# Patient Record
Sex: FEMALE | Race: OTHER | HISPANIC OR LATINO | Employment: FULL TIME | URBAN - METROPOLITAN AREA
[De-identification: names, ages, dates, MRNs, and addresses within clinical notes are randomized per-mention and may not be internally consistent; named-entity substitution may affect disease eponyms.]

---

## 2017-02-20 ENCOUNTER — HOSPITAL ENCOUNTER (EMERGENCY)
Facility: HOSPITAL | Age: 47
Discharge: HOME | End: 2017-02-20
Attending: EMERGENCY MEDICINE | Admitting: EMERGENCY MEDICINE
Payer: MEDICAID

## 2017-02-20 VITALS
WEIGHT: 110 LBS | SYSTOLIC BLOOD PRESSURE: 157 MMHG | RESPIRATION RATE: 18 BRPM | OXYGEN SATURATION: 98 % | HEART RATE: 85 BPM | TEMPERATURE: 98 F | DIASTOLIC BLOOD PRESSURE: 94 MMHG

## 2017-02-20 DIAGNOSIS — G56.01 CARPAL TUNNEL SYNDROME OF RIGHT WRIST: Primary | ICD-10-CM

## 2017-02-20 PROCEDURE — 99283 EMERGENCY DEPT VISIT LOW MDM: CPT

## 2017-02-20 RX ORDER — IBUPROFEN 600 MG/1
600 TABLET ORAL ONCE
Status: COMPLETED | OUTPATIENT
Start: 2017-02-20 | End: 2017-02-20

## 2017-02-20 RX ORDER — NAPROXEN 500 MG/1
500 TABLET ORAL 2 TIMES DAILY WITH MEALS
Qty: 20 TABLET | Refills: 0 | Status: SHIPPED | OUTPATIENT
Start: 2017-02-20 | End: 2019-04-26 | Stop reason: ALTCHOICE

## 2017-02-20 RX ADMIN — IBUPROFEN 600 MG: 600 TABLET, FILM COATED ORAL at 10:06

## 2018-05-12 ENCOUNTER — CONVERSION ENCOUNTER (OUTPATIENT)
Dept: MAMMOGRAPHY | Facility: CLINIC | Age: 48
End: 2018-05-12

## 2018-05-15 LAB
CCP IGG SERPL-ACNC: <16 UNITS
CRP SERPL-MCNC: 0.6 MG/L
LA PPP-IMP: NORMAL
RHEUMATOID FACT SERPL-ACNC: <14 IU/ML

## 2018-05-23 LAB
LA PPP-IMP: NORMAL
MIXING STUDIES PPP-IMP: NORMAL
SCREEN APTT: 37 SEC
SCREEN DRVVT: 43 SEC

## 2018-06-12 ENCOUNTER — CONVERSION ENCOUNTER (OUTPATIENT)
Dept: MAMMOGRAPHY | Facility: CLINIC | Age: 48
End: 2018-06-12

## 2018-06-25 ENCOUNTER — TRANSCRIBE ORDERS (OUTPATIENT)
Dept: ADMINISTRATIVE | Facility: HOSPITAL | Age: 48
End: 2018-06-25

## 2018-06-25 DIAGNOSIS — Z12.39 SCREENING BREAST EXAMINATION: Primary | ICD-10-CM

## 2019-04-26 ENCOUNTER — HOSPITAL ENCOUNTER (EMERGENCY)
Facility: HOSPITAL | Age: 49
Discharge: HOME | End: 2019-04-26
Attending: EMERGENCY MEDICINE

## 2019-04-26 VITALS
RESPIRATION RATE: 16 BRPM | TEMPERATURE: 98.4 F | DIASTOLIC BLOOD PRESSURE: 95 MMHG | HEART RATE: 78 BPM | SYSTOLIC BLOOD PRESSURE: 163 MMHG | WEIGHT: 130.07 LBS | OXYGEN SATURATION: 98 %

## 2019-04-26 DIAGNOSIS — M25.512 ACUTE PAIN OF LEFT SHOULDER: Primary | ICD-10-CM

## 2019-04-26 PROCEDURE — 96372 THER/PROPH/DIAG INJ SC/IM: CPT

## 2019-04-26 PROCEDURE — 99283 EMERGENCY DEPT VISIT LOW MDM: CPT

## 2019-04-26 PROCEDURE — 99283 EMERGENCY DEPT VISIT LOW MDM: CPT | Performed by: EMERGENCY MEDICINE

## 2019-04-26 RX ORDER — KETOROLAC TROMETHAMINE 30 MG/ML
30 INJECTION, SOLUTION INTRAMUSCULAR; INTRAVENOUS ONCE
Status: COMPLETED | OUTPATIENT
Start: 2019-04-26 | End: 2019-04-26

## 2019-04-26 RX ORDER — NAPROXEN 500 MG/1
500 TABLET ORAL 2 TIMES DAILY WITH MEALS
Qty: 20 TABLET | Refills: 0 | Status: SHIPPED | OUTPATIENT
Start: 2019-04-26 | End: 2020-03-07

## 2019-04-26 RX ORDER — LOPERAMIDE HYDROCHLORIDE 2 MG/1
2 CAPSULE ORAL 4 TIMES DAILY PRN
COMMUNITY
End: 2020-03-07

## 2019-04-26 RX ADMIN — KETOROLAC TROMETHAMINE 30 MG: 30 INJECTION, SOLUTION INTRAMUSCULAR at 07:24

## 2019-04-29 ENCOUNTER — TELEPHONE (OUTPATIENT)
Dept: OBGYN CLINIC | Facility: MEDICAL CENTER | Age: 49
End: 2019-04-29

## 2019-11-25 ENCOUNTER — HOSPITAL ENCOUNTER (EMERGENCY)
Facility: HOSPITAL | Age: 49
Discharge: HOME | End: 2019-11-25
Attending: EMERGENCY MEDICINE | Admitting: EMERGENCY MEDICINE

## 2019-11-25 ENCOUNTER — APPOINTMENT (EMERGENCY)
Dept: CT IMAGING | Facility: HOSPITAL | Age: 49
End: 2019-11-25

## 2019-11-25 VITALS
OXYGEN SATURATION: 100 % | RESPIRATION RATE: 18 BRPM | WEIGHT: 129.63 LBS | TEMPERATURE: 98.3 F | DIASTOLIC BLOOD PRESSURE: 65 MMHG | HEART RATE: 66 BPM | SYSTOLIC BLOOD PRESSURE: 129 MMHG

## 2019-11-25 DIAGNOSIS — R55 NEAR SYNCOPE: Primary | ICD-10-CM

## 2019-11-25 DIAGNOSIS — R20.2 LEFT HAND PARESTHESIA: ICD-10-CM

## 2019-11-25 LAB
ALBUMIN SERPL BCP-MCNC: 3.9 G/DL (ref 3.5–5)
ALP SERPL-CCNC: 87 U/L (ref 46–116)
ALT SERPL W P-5'-P-CCNC: 20 U/L (ref 12–78)
ANION GAP SERPL CALCULATED.3IONS-SCNC: 8 MMOL/L (ref 4–13)
APTT PPP: 28 SECONDS (ref 23–37)
AST SERPL W P-5'-P-CCNC: 21 U/L (ref 5–45)
ATRIAL RATE: 60 BPM
BASOPHILS # BLD AUTO: 0.07 THOUSANDS/ΜL (ref 0–0.1)
BASOPHILS NFR BLD AUTO: 1 % (ref 0–1)
BILIRUB SERPL-MCNC: 0.3 MG/DL (ref 0.2–1)
BUN SERPL-MCNC: 11 MG/DL (ref 5–25)
CALCIUM SERPL-MCNC: 9 MG/DL (ref 8.3–10.1)
CHLORIDE SERPL-SCNC: 103 MMOL/L (ref 100–108)
CO2 SERPL-SCNC: 28 MMOL/L (ref 21–32)
CREAT SERPL-MCNC: 0.47 MG/DL (ref 0.6–1.3)
EOSINOPHIL # BLD AUTO: 0.13 THOUSAND/ΜL (ref 0–0.61)
EOSINOPHIL NFR BLD AUTO: 2 % (ref 0–6)
ERYTHROCYTE [DISTWIDTH] IN BLOOD BY AUTOMATED COUNT: 14.1 % (ref 11.6–15.1)
EXT PREG TEST URINE: NEGATIVE
EXT. CONTROL ED NAV: NORMAL
GFR SERPL CREATININE-BSD FRML MDRD: 118 ML/MIN/1.73SQ M
GLUCOSE SERPL-MCNC: 95 MG/DL (ref 65–140)
HCT VFR BLD AUTO: 44.8 % (ref 34.8–46.1)
HGB BLD-MCNC: 13.6 G/DL (ref 11.5–15.4)
IMM GRANULOCYTES # BLD AUTO: 0.02 THOUSAND/UL (ref 0–0.2)
IMM GRANULOCYTES NFR BLD AUTO: 0 % (ref 0–2)
INR PPP: 1.08 (ref 0.84–1.19)
LYMPHOCYTES # BLD AUTO: 1.41 THOUSANDS/ΜL (ref 0.6–4.47)
LYMPHOCYTES NFR BLD AUTO: 19 % (ref 14–44)
MCH RBC QN AUTO: 28.1 PG (ref 26.8–34.3)
MCHC RBC AUTO-ENTMCNC: 30.4 G/DL (ref 31.4–37.4)
MCV RBC AUTO: 93 FL (ref 82–98)
MONOCYTES # BLD AUTO: 0.4 THOUSAND/ΜL (ref 0.17–1.22)
MONOCYTES NFR BLD AUTO: 5 % (ref 4–12)
NEUTROPHILS # BLD AUTO: 5.59 THOUSANDS/ΜL (ref 1.85–7.62)
NEUTS SEG NFR BLD AUTO: 73 % (ref 43–75)
NRBC BLD AUTO-RTO: 0 /100 WBCS
P AXIS: 61 DEGREES
PLATELET # BLD AUTO: 219 THOUSANDS/UL (ref 149–390)
PMV BLD AUTO: 12.9 FL (ref 8.9–12.7)
POTASSIUM SERPL-SCNC: 3.9 MMOL/L (ref 3.5–5.3)
PR INTERVAL: 150 MS
PROT SERPL-MCNC: 8.1 G/DL (ref 6.4–8.2)
PROTHROMBIN TIME: 14.1 SECONDS (ref 11.6–14.5)
QRS AXIS: 77 DEGREES
QRSD INTERVAL: 82 MS
QT INTERVAL: 422 MS
QTC INTERVAL: 422 MS
RBC # BLD AUTO: 4.84 MILLION/UL (ref 3.81–5.12)
SODIUM SERPL-SCNC: 139 MMOL/L (ref 136–145)
T WAVE AXIS: 71 DEGREES
TROPONIN I SERPL-MCNC: <0.02 NG/ML
TSH SERPL DL<=0.05 MIU/L-ACNC: 2.27 UIU/ML (ref 0.36–3.74)
VENTRICULAR RATE: 60 BPM
WBC # BLD AUTO: 7.62 THOUSAND/UL (ref 4.31–10.16)

## 2019-11-25 PROCEDURE — 96360 HYDRATION IV INFUSION INIT: CPT

## 2019-11-25 PROCEDURE — 85730 THROMBOPLASTIN TIME PARTIAL: CPT | Performed by: EMERGENCY MEDICINE

## 2019-11-25 PROCEDURE — 70498 CT ANGIOGRAPHY NECK: CPT

## 2019-11-25 PROCEDURE — 36415 COLL VENOUS BLD VENIPUNCTURE: CPT | Performed by: EMERGENCY MEDICINE

## 2019-11-25 PROCEDURE — 93005 ELECTROCARDIOGRAM TRACING: CPT

## 2019-11-25 PROCEDURE — 93010 ELECTROCARDIOGRAM REPORT: CPT | Performed by: INTERNAL MEDICINE

## 2019-11-25 PROCEDURE — 99284 EMERGENCY DEPT VISIT MOD MDM: CPT

## 2019-11-25 PROCEDURE — 85025 COMPLETE CBC W/AUTO DIFF WBC: CPT | Performed by: EMERGENCY MEDICINE

## 2019-11-25 PROCEDURE — 84443 ASSAY THYROID STIM HORMONE: CPT | Performed by: EMERGENCY MEDICINE

## 2019-11-25 PROCEDURE — 85610 PROTHROMBIN TIME: CPT | Performed by: EMERGENCY MEDICINE

## 2019-11-25 PROCEDURE — 84484 ASSAY OF TROPONIN QUANT: CPT | Performed by: EMERGENCY MEDICINE

## 2019-11-25 PROCEDURE — 70496 CT ANGIOGRAPHY HEAD: CPT

## 2019-11-25 PROCEDURE — 80053 COMPREHEN METABOLIC PANEL: CPT | Performed by: EMERGENCY MEDICINE

## 2019-11-25 PROCEDURE — 81025 URINE PREGNANCY TEST: CPT | Performed by: EMERGENCY MEDICINE

## 2019-11-25 PROCEDURE — 99285 EMERGENCY DEPT VISIT HI MDM: CPT | Performed by: EMERGENCY MEDICINE

## 2019-11-25 RX ADMIN — IOHEXOL 85 ML: 350 INJECTION, SOLUTION INTRAVENOUS at 16:05

## 2019-11-25 RX ADMIN — SODIUM CHLORIDE 1000 ML: 0.9 INJECTION, SOLUTION INTRAVENOUS at 15:09

## 2019-11-25 NOTE — ED PROVIDER NOTES
History  Chief Complaint   Patient presents with    Syncope     Pt  has a syncopal episode about 30 minutes ago  Pt  reports fainting and being caught by her   Pt  reports since then her left hand has been numb  Pt  Had a presyncopal event when she felt dizzy while walking today  And her  caught her   She didn't lose consiousness  She started with L hand numbness since about 12:30pm or so  Prior to Admission Medications   Prescriptions Last Dose Informant Patient Reported? Taking?   loperamide (IMODIUM) 2 mg capsule   Yes No   Sig: Take 2 mg by mouth 4 (four) times a day as needed for diarrhea   naproxen (NAPROSYN) 500 mg tablet   No No   Sig: Take 1 tablet (500 mg total) by mouth 2 (two) times a day with meals for 10 days      Facility-Administered Medications: None       Past Medical History:   Diagnosis Date    Gluten free diet        History reviewed  No pertinent surgical history  History reviewed  No pertinent family history  I have reviewed and agree with the history as documented  Social History     Tobacco Use    Smoking status: Never Smoker    Smokeless tobacco: Never Used   Substance Use Topics    Alcohol use: No    Drug use: No        Review of Systems   Constitutional: Negative for appetite change, fatigue and fever  HENT: Negative for rhinorrhea and sore throat  Respiratory: Negative for cough, shortness of breath and wheezing  Cardiovascular: Negative for chest pain and leg swelling  Gastrointestinal: Negative for abdominal pain, diarrhea and vomiting  Genitourinary: Negative for dysuria and flank pain  Musculoskeletal: Negative for back pain and neck pain  Skin: Negative for rash  Neurological: Positive for syncope and numbness  Negative for headaches  Psychiatric/Behavioral:        Mood normal       Physical Exam  Physical Exam   Constitutional: She is oriented to person, place, and time  She appears well-developed and well-nourished  HENT:   Head: Normocephalic and atraumatic  Mouth/Throat: Oropharynx is clear and moist    Eyes: Pupils are equal, round, and reactive to light  Neck: Normal range of motion  Neck supple  Cardiovascular: Normal rate and regular rhythm  Pulmonary/Chest: Effort normal and breath sounds normal    Abdominal: Soft  There is no tenderness  Musculoskeletal: Normal range of motion  Neurological: She is alert and oriented to person, place, and time  No cranial nerve deficit or sensory deficit  No motor or sensory losses   Skin: Skin is warm and dry  Nursing note and vitals reviewed  Vital Signs  ED Triage Vitals [11/25/19 1423]   Temperature Pulse Respirations Blood Pressure SpO2   98 3 °F (36 8 °C) 70 20 153/77 100 %      Temp Source Heart Rate Source Patient Position - Orthostatic VS BP Location FiO2 (%)   Oral Monitor Sitting Right arm --      Pain Score       No Pain           Vitals:    11/25/19 1423 11/25/19 1515 11/25/19 1705   BP: 153/77 141/79 129/65   Pulse: 70 60 66   Patient Position - Orthostatic VS: Sitting Lying Lying         Visual Acuity      ED Medications  Medications   sodium chloride 0 9 % bolus 1,000 mL (0 mL Intravenous Stopped 11/25/19 1611)   iohexol (OMNIPAQUE) 350 MG/ML injection (MULTI-DOSE) 85 mL (85 mL Intravenous Given 11/25/19 1605)       Diagnostic Studies  Results Reviewed     Procedure Component Value Units Date/Time    TSH [[id number]]  (Normal) Collected:  11/25/19 1509    Lab Status:  Final result Specimen:  Blood from Arm, Right Updated:  11/25/19 1551     TSH 3RD GENERATON 2 272 uIU/mL     Narrative:       Patients undergoing fluorescein dye angiography may retain small amounts of fluorescein in the body for 48-72 hours post procedure  Samples containing fluorescein can produce falsely depressed TSH values  If the patient had this procedure,a specimen should be resubmitted post fluorescein clearance        Troponin I [[id number]]  (Normal) Collected:  11/25/19 1731 Lab Status:  Final result Specimen:  Blood from Arm, Right Updated:  11/25/19 1545     Troponin I <0 02 ng/mL     Comprehensive metabolic panel [[id number]]  (Abnormal) Collected:  11/25/19 1509    Lab Status:  Final result Specimen:  Blood from Arm, Right Updated:  11/25/19 1543     Sodium 139 mmol/L      Potassium 3 9 mmol/L      Chloride 103 mmol/L      CO2 28 mmol/L      ANION GAP 8 mmol/L      BUN 11 mg/dL      Creatinine 0 47 mg/dL      Glucose 95 mg/dL      Calcium 9 0 mg/dL      AST 21 U/L      ALT 20 U/L      Alkaline Phosphatase 87 U/L      Total Protein 8 1 g/dL      Albumin 3 9 g/dL      Total Bilirubin 0 30 mg/dL      eGFR 118 ml/min/1 73sq m     Narrative:       [facility] guidelines for Chronic Kidney Disease (CKD):     Stage 1 with normal or high GFR (GFR > 90 mL/min/1 73 square meters)    Stage 2 Mild CKD (GFR = 60-89 mL/min/1 73 square meters)    Stage 3A Moderate CKD (GFR = 45-59 mL/min/1 73 square meters)    Stage 3B Moderate CKD (GFR = 30-44 mL/min/1 73 square meters)    Stage 4 Severe CKD (GFR = 15-29 mL/min/1 73 square meters)    Stage 5 End Stage CKD (GFR <15 mL/min/1 73 square meters)  Note: GFR calculation is accurate only with a steady state creatinine    Protime-INR [[id number]]  (Normal) Collected:  11/25/19 1510    Lab Status:  Final result Specimen:  Blood from Arm, Right Updated:  11/25/19 1531     Protime 14 1 seconds      INR 1 08    APTT [[id number]]  (Normal) Collected:  11/25/19 1510    Lab Status:  Final result Specimen:  Blood from Arm, Right Updated:  11/25/19 1531     PTT 28 seconds     POCT pregnancy, urine [[id number]]  (Normal) Resulted:  11/25/19 1523    Lab Status:  Final result Updated:  11/25/19 1523     EXT PREG TEST UR (Ref: Negative) negative     Control valid    CBC and differential [[id number]]  (Abnormal) Collected:  11/25/19 1509    Lab Status:  Final result Specimen:  Blood from Arm, Right Updated:  11/25/19 1514     WBC 7 62 Thousand/uL RBC 4 84 Million/uL      Hemoglobin 13 6 g/dL      Hematocrit 44 8 %      MCV 93 fL      MCH 28 1 pg      MCHC 30 4 g/dL      RDW 14 1 %      MPV 12 9 fL      Platelets 077 Thousands/uL      nRBC 0 /100 WBCs      Neutrophils Relative 73 %      Immat GRANS % 0 %      Lymphocytes Relative 19 %      Monocytes Relative 5 %      Eosinophils Relative 2 %      Basophils Relative 1 %      Neutrophils Absolute 5 59 Thousands/µL      Immature Grans Absolute 0 02 Thousand/uL      Lymphocytes Absolute 1 41 Thousands/µL      Monocytes Absolute 0 40 Thousand/µL      Eosinophils Absolute 0 13 Thousand/µL      Basophils Absolute 0 07 Thousands/µL                  CTA head and neck with and without contrast   Final Result by Haseeb Rooney MD (11/25 1635)      No acute intracranial disease  No large vessel flow restrictive disease within the head or neck                    Workstation performed: [id number]                    Procedures  ECG 12 Lead Documentation Only  Date/Time: 11/25/2019 3:11 PM  Performed by: Chong Cantu MD  Authorized by: Chong Cantu MD     Rate:     ECG rate:  60    ECG rate assessment: normal    Rhythm:     Rhythm: sinus rhythm    Comments:      No st elevation or depression           ED Course             Stroke Assessment     Row Name 11/25/19 1504             NIH Stroke Scale    Interval  Baseline      Level of Consciousness (1a )  0      LOC Questions (1b )  0      LOC Commands (1c )  0      Best Gaze (2 )  0      Visual (3 )  0      Facial Palsy (4 )  0      Motor Arm, Left (5a )  0      Motor Arm, Right (5b )  0      Motor Leg, Left (6a )  0      Motor Leg, Right (6b )  0      Limb Ataxia (7 )  0      Sensory (8 )  0      Best Language (9 )  0      Dysarthria (10 )  0      Extinction and Inattention (11 ) (Formerly Neglect)  0      Total  0                          MDM  Number of Diagnoses or Management Options  Left hand paresthesia:   Near syncope:      Amount and/or Complexity of Data Reviewed  Clinical lab tests: ordered and reviewed  Tests in the radiology section of CPT®: ordered and reviewed    Risk of Complications, Morbidity, and/or Mortality  Presenting problems: moderate  General comments: I spoke to Dr Mehrdad Yuen, neurologist on call  I went over the case with him  Pt  Has a normal neuro exam   NIH score of 0  She wouldn't need tpa so she does not need to be a stroke alert and since the L hand numbness is subjective she can follow up as an outpt  Disposition  Final diagnoses:   Near syncope   Left hand paresthesia     Time reflects when diagnosis was documented in both MDM as applicable and the Disposition within this note     Time User Action Codes Description Comment    11/25/2019  4:59 PM Madeline Denton Add [R55] Near syncope     11/25/2019  4:59 PM Casi Clarke Add [R20 2] Left hand paresthesia       ED Disposition     ED Disposition Condition Date/Time Comment    Discharge Stable Mon Nov 25, 2019  4:59 PM Serafin Mahajan discharge to home/self care              Follow-up Information     Follow up With Specialties Details Why Contact Info Additional [id number] [id number] [facility] [id number]  [address] [place] [address],[address], [address]    [facility]   [address] [facility] [address]  [address] [place] [address] Neurology [facility], [address], [address]          Discharge Medication List as of 11/25/2019  5:00 PM      CONTINUE these medications which have NOT CHANGED    Details   loperamide (IMODIUM) 2 mg capsule Take 2 mg by mouth 4 (four) times a day as needed for diarrhea, Historical Med      naproxen (NAPROSYN) 500 mg tablet Take 1 tablet (500 mg total) by mouth 2 (two) times a day with meals for 10 days, Starting Fri 4/26/2019, Until Mon 5/6/2019, Print           No discharge procedures on file      ED Provider  Electronically Signed by           Gabriela Wu MD  11/25/19 4291

## 2020-03-07 ENCOUNTER — APPOINTMENT (EMERGENCY)
Dept: CT IMAGING | Facility: HOSPITAL | Age: 50
End: 2020-03-07

## 2020-03-07 ENCOUNTER — HOSPITAL ENCOUNTER (EMERGENCY)
Facility: HOSPITAL | Age: 50
Discharge: HOME | End: 2020-03-07
Attending: EMERGENCY MEDICINE | Admitting: EMERGENCY MEDICINE

## 2020-03-07 VITALS
WEIGHT: 122.36 LBS | SYSTOLIC BLOOD PRESSURE: 133 MMHG | HEART RATE: 64 BPM | OXYGEN SATURATION: 100 % | DIASTOLIC BLOOD PRESSURE: 73 MMHG | TEMPERATURE: 97.4 F | RESPIRATION RATE: 16 BRPM

## 2020-03-07 DIAGNOSIS — K52.9 COLITIS: Primary | ICD-10-CM

## 2020-03-07 LAB
ALBUMIN SERPL BCP-MCNC: 3.5 G/DL (ref 3.5–5)
ALP SERPL-CCNC: 66 U/L (ref 46–116)
ALT SERPL W P-5'-P-CCNC: 22 U/L (ref 12–78)
ANION GAP SERPL CALCULATED.3IONS-SCNC: 9 MMOL/L (ref 4–13)
AST SERPL W P-5'-P-CCNC: 17 U/L (ref 5–45)
BASOPHILS # BLD AUTO: 0.04 THOUSANDS/ΜL (ref 0–0.1)
BASOPHILS NFR BLD AUTO: 1 % (ref 0–1)
BILIRUB SERPL-MCNC: 0.23 MG/DL (ref 0.2–1)
BILIRUB UR QL STRIP: NEGATIVE
BUN SERPL-MCNC: 12 MG/DL (ref 5–25)
CALCIUM SERPL-MCNC: 8.3 MG/DL (ref 8.3–10.1)
CHLORIDE SERPL-SCNC: 104 MMOL/L (ref 100–108)
CLARITY UR: CLEAR
CLARITY, POC: CLEAR
CO2 SERPL-SCNC: 24 MMOL/L (ref 21–32)
COLOR UR: YELLOW
COLOR, POC: YELLOW
CREAT SERPL-MCNC: 0.51 MG/DL (ref 0.6–1.3)
EOSINOPHIL # BLD AUTO: 0.08 THOUSAND/ΜL (ref 0–0.61)
EOSINOPHIL NFR BLD AUTO: 1 % (ref 0–6)
ERYTHROCYTE [DISTWIDTH] IN BLOOD BY AUTOMATED COUNT: 14.6 % (ref 11.6–15.1)
EXT PREG TEST URINE: NEGATIVE
EXT. CONTROL ED NAV: NORMAL
GFR SERPL CREATININE-BSD FRML MDRD: 115 ML/MIN/1.73SQ M
GLUCOSE SERPL-MCNC: 102 MG/DL (ref 65–140)
GLUCOSE UR STRIP-MCNC: NEGATIVE MG/DL
HCT VFR BLD AUTO: 38.7 % (ref 34.8–46.1)
HGB BLD-MCNC: 11.6 G/DL (ref 11.5–15.4)
HGB UR QL STRIP.AUTO: NEGATIVE
IMM GRANULOCYTES # BLD AUTO: 0.01 THOUSAND/UL (ref 0–0.2)
IMM GRANULOCYTES NFR BLD AUTO: 0 % (ref 0–2)
KETONES UR STRIP-MCNC: ABNORMAL MG/DL
LEUKOCYTE ESTERASE UR QL STRIP: NEGATIVE
LIPASE SERPL-CCNC: 186 U/L (ref 73–393)
LYMPHOCYTES # BLD AUTO: 0.9 THOUSANDS/ΜL (ref 0.6–4.47)
LYMPHOCYTES NFR BLD AUTO: 12 % (ref 14–44)
MCH RBC QN AUTO: 26.6 PG (ref 26.8–34.3)
MCHC RBC AUTO-ENTMCNC: 30 G/DL (ref 31.4–37.4)
MCV RBC AUTO: 89 FL (ref 82–98)
MONOCYTES # BLD AUTO: 0.32 THOUSAND/ΜL (ref 0.17–1.22)
MONOCYTES NFR BLD AUTO: 4 % (ref 4–12)
NEUTROPHILS # BLD AUTO: 6.38 THOUSANDS/ΜL (ref 1.85–7.62)
NEUTS SEG NFR BLD AUTO: 82 % (ref 43–75)
NITRITE UR QL STRIP: NEGATIVE
NRBC BLD AUTO-RTO: 0 /100 WBCS
PH UR STRIP.AUTO: 5.5 [PH] (ref 4.5–8)
PLATELET # BLD AUTO: 222 THOUSANDS/UL (ref 149–390)
PMV BLD AUTO: 13.2 FL (ref 8.9–12.7)
POTASSIUM SERPL-SCNC: 3.9 MMOL/L (ref 3.5–5.3)
PROT SERPL-MCNC: 7.5 G/DL (ref 6.4–8.2)
PROT UR STRIP-MCNC: NEGATIVE MG/DL
RBC # BLD AUTO: 4.36 MILLION/UL (ref 3.81–5.12)
SODIUM SERPL-SCNC: 137 MMOL/L (ref 136–145)
SP GR UR STRIP.AUTO: >=1.03 (ref 1–1.03)
UROBILINOGEN UR QL STRIP.AUTO: 0.2 E.U./DL
WBC # BLD AUTO: 7.73 THOUSAND/UL (ref 4.31–10.16)

## 2020-03-07 PROCEDURE — 83690 ASSAY OF LIPASE: CPT | Performed by: EMERGENCY MEDICINE

## 2020-03-07 PROCEDURE — 96375 TX/PRO/DX INJ NEW DRUG ADDON: CPT

## 2020-03-07 PROCEDURE — 96361 HYDRATE IV INFUSION ADD-ON: CPT

## 2020-03-07 PROCEDURE — 99284 EMERGENCY DEPT VISIT MOD MDM: CPT

## 2020-03-07 PROCEDURE — 74177 CT ABD & PELVIS W/CONTRAST: CPT

## 2020-03-07 PROCEDURE — 99285 EMERGENCY DEPT VISIT HI MDM: CPT | Performed by: EMERGENCY MEDICINE

## 2020-03-07 PROCEDURE — 36415 COLL VENOUS BLD VENIPUNCTURE: CPT | Performed by: EMERGENCY MEDICINE

## 2020-03-07 PROCEDURE — 96374 THER/PROPH/DIAG INJ IV PUSH: CPT

## 2020-03-07 PROCEDURE — 81025 URINE PREGNANCY TEST: CPT | Performed by: EMERGENCY MEDICINE

## 2020-03-07 PROCEDURE — 85025 COMPLETE CBC W/AUTO DIFF WBC: CPT | Performed by: EMERGENCY MEDICINE

## 2020-03-07 PROCEDURE — 80053 COMPREHEN METABOLIC PANEL: CPT | Performed by: EMERGENCY MEDICINE

## 2020-03-07 PROCEDURE — 81003 URINALYSIS AUTO W/O SCOPE: CPT

## 2020-03-07 RX ORDER — MORPHINE SULFATE 4 MG/ML
4 INJECTION, SOLUTION INTRAMUSCULAR; INTRAVENOUS ONCE
Status: COMPLETED | OUTPATIENT
Start: 2020-03-07 | End: 2020-03-07

## 2020-03-07 RX ORDER — DICYCLOMINE HYDROCHLORIDE 10 MG/1
10 CAPSULE ORAL ONCE
Status: COMPLETED | OUTPATIENT
Start: 2020-03-07 | End: 2020-03-07

## 2020-03-07 RX ORDER — DICYCLOMINE HCL 20 MG
20 TABLET ORAL 4 TIMES DAILY PRN
Qty: 12 TABLET | Refills: 0 | Status: SHIPPED | OUTPATIENT
Start: 2020-03-07 | End: 2021-06-07 | Stop reason: ALTCHOICE

## 2020-03-07 RX ORDER — ONDANSETRON 4 MG/1
4 TABLET, ORALLY DISINTEGRATING ORAL EVERY 6 HOURS PRN
Qty: 8 TABLET | Refills: 0 | Status: SHIPPED | OUTPATIENT
Start: 2020-03-07 | End: 2021-06-07 | Stop reason: ALTCHOICE

## 2020-03-07 RX ADMIN — SODIUM CHLORIDE 1000 ML: 0.9 INJECTION, SOLUTION INTRAVENOUS at 10:30

## 2020-03-07 RX ADMIN — FAMOTIDINE 20 MG: 10 INJECTION INTRAVENOUS at 10:31

## 2020-03-07 RX ADMIN — DICYCLOMINE HYDROCHLORIDE 10 MG: 10 CAPSULE ORAL at 10:18

## 2020-03-07 RX ADMIN — IOHEXOL 100 ML: 350 INJECTION, SOLUTION INTRAVENOUS at 11:45

## 2020-03-07 RX ADMIN — MORPHINE SULFATE 4 MG: 4 INJECTION INTRAVENOUS at 12:42

## 2020-03-07 NOTE — DISCHARGE INSTRUCTIONS
Colitis  LO QUE NECESITAS SABER:  La colitis es hinchazón e irritación de bills colon  La colitis puede ser causada por úlceras o un problema con bills sistema inmunológico  Las bacterias, un virus o un parásito también pueden causar colitis  La causa puede no ser conocida  Es posible que tenga diarrea, dolor abdominal, fiebre o gayle o moco en el movimiento intestinal   INSTRUCCIONES DE DESCARGA:  Regrese al departamento de emergencias si:   Tienes problemas repentinos para respirar  Tamika deposiciones son negras o continúan teniendo gayle en ellas  Tienes gayle en tu vómito  Tiene dolor abdominal intenso o bills abdomen está hinchado y se siente bebeto  Tiene alguno de los siguientes signos de deshidratación:     Mareos o debilidad     Sequedad en la boca, labios agrietados o sed severa   Latidos cardíacos rápidos o respiración     Orinar muy poco o nada  Póngase en contacto con bills proveedor de atención médica si:   Tamika síntomas empeoran o no desaparecen  Tiene fiebre, escalofríos, tos o se siente débil y adolorido  De repente pierdes peso sin intentarlo  Tiene preguntas o inquietudes sobre bills afección o atención

## 2020-03-07 NOTE — ED PROVIDER NOTES
History  Chief Complaint   Patient presents with    Abdominal Pain     since last night, pain and diarrhea  History provided by:  Patient  Abdominal Pain   Pain location:  Generalized  Pain radiates to:  Does not radiate  Pain severity:  Moderate  Onset quality:  Gradual  Duration:  12 hours  Timing:  Constant  Progression:  Worsening  Chronicity:  New  Context: awakening from sleep    Relieved by:  None tried  Worsened by:  Nothing  Associated symptoms: chills, diarrhea, fever and nausea    Associated symptoms: no chest pain, no cough, no dysuria, no hematuria, no shortness of breath, no sore throat and no vomiting    Diarrhea:     Quality:  Watery    Number of occurrences:  More than 8    Severity:  Moderate  Risk factors: has not had multiple surgeries        None       Past Medical History:   Diagnosis Date    Gluten free diet        Past Surgical History:   Procedure Laterality Date     SECTION         No family history on file  I have reviewed and agree with the history as documented  E-Cigarette/Vaping     E-Cigarette/Vaping Substances     Social History     Tobacco Use    Smoking status: Never Smoker    Smokeless tobacco: Never Used   Substance Use Topics    Alcohol use: No    Drug use: No       Review of Systems   Constitutional: Positive for chills and fever  Negative for appetite change  HENT: Negative for sore throat  Respiratory: Negative for cough, shortness of breath and wheezing  Cardiovascular: Negative for chest pain and palpitations  Gastrointestinal: Positive for abdominal pain, diarrhea and nausea  Negative for vomiting  Genitourinary: Negative for dysuria and hematuria  Musculoskeletal: Negative for neck pain  Skin: Negative for rash  Neurological: Negative for dizziness, weakness and headaches  Psychiatric/Behavioral: Negative for suicidal ideas  All other systems reviewed and are negative        Physical Exam  Physical Exam   Constitutional: She is oriented to person, place, and time  Vital signs are normal  She appears well-developed and well-nourished  Non-toxic appearance  HENT:   Head: Normocephalic and atraumatic  Right Ear: Tympanic membrane and external ear normal    Left Ear: Tympanic membrane and external ear normal    Nose: Nose normal    Mouth/Throat: Oropharynx is clear and moist    Eyes: Pupils are equal, round, and reactive to light  Conjunctivae and EOM are normal    Neck: Normal range of motion and full passive range of motion without pain  Neck supple  No Brudzinski's sign and no Kernig's sign noted  Cardiovascular: Normal rate, regular rhythm, normal heart sounds, intact distal pulses and normal pulses  No murmur heard  Pulmonary/Chest: Effort normal and breath sounds normal  No tachypnea  No respiratory distress  She has no wheezes  Abdominal: Soft  She exhibits no distension  Bowel sounds are increased  There is generalized tenderness and tenderness in the right lower quadrant and suprapubic area  There is no rigidity, no rebound and no guarding  Musculoskeletal: Normal range of motion  Right lower leg: She exhibits no swelling  Left lower leg: She exhibits no swelling  Lymphadenopathy:     She has no cervical adenopathy  Neurological: She is alert and oriented to person, place, and time  She has normal strength and normal reflexes  No cranial nerve deficit or sensory deficit  Coordination and gait normal  GCS eye subscore is 4  GCS verbal subscore is 5  GCS motor subscore is 6  Skin: Skin is warm and dry  No rash noted  She is not diaphoretic  No pallor  Psychiatric: She has a normal mood and affect  Her speech is normal and behavior is normal  Judgment and thought content normal  Cognition and memory are normal    Nursing note and vitals reviewed        Vital Signs  ED Triage Vitals [03/07/20 0942]   Temperature Pulse Respirations Blood Pressure SpO2   (!) 97 4 °F (36 3 °C) 60 18 107/63 100 % Temp Source Heart Rate Source Patient Position - Orthostatic VS BP Location FiO2 (%)   Oral Monitor Sitting Right arm --      Pain Score       8           Vitals:    03/07/20 0942 03/07/20 1057 03/07/20 1100 03/07/20 1242   BP: 107/63 131/67 131/67 133/73   Pulse: 60 60 60 64   Patient Position - Orthostatic VS: Sitting Lying           Visual Acuity      ED Medications  Medications   sodium chloride 0 9 % bolus 1,000 mL (0 mL Intravenous Stopped 3/7/20 1242)   famotidine (PEPCID) injection 20 mg (20 mg Intravenous Given 3/7/20 1031)   dicyclomine (BENTYL) capsule 10 mg (10 mg Oral Given 3/7/20 1018)   iohexol (OMNIPAQUE) 350 MG/ML injection (SINGLE-DOSE) 100 mL (100 mL Intravenous Given 3/7/20 1145)   morphine (PF) 4 mg/mL injection 4 mg (4 mg Intravenous Given 3/7/20 1242)       Diagnostic Studies  Results Reviewed     Procedure Component Value Units Date/Time    Comprehensive metabolic panel [[id number]]  (Abnormal) Collected:  03/07/20 1029    Lab Status:  Final result Specimen:  Blood from Arm, Left Updated:  03/07/20 1058     Sodium 137 mmol/L      Potassium 3 9 mmol/L      Chloride 104 mmol/L      CO2 24 mmol/L      ANION GAP 9 mmol/L      BUN 12 mg/dL      Creatinine 0 51 mg/dL      Glucose 102 mg/dL      Calcium 8 3 mg/dL      AST 17 U/L      ALT 22 U/L      Alkaline Phosphatase 66 U/L      Total Protein 7 5 g/dL      Albumin 3 5 g/dL      Total Bilirubin 0 23 mg/dL      eGFR 115 ml/min/1 73sq m     Narrative:       [facility] guidelines for Chronic Kidney Disease (CKD):     Stage 1 with normal or high GFR (GFR > 90 mL/min/1 73 square meters)    Stage 2 Mild CKD (GFR = 60-89 mL/min/1 73 square meters)    Stage 3A Moderate CKD (GFR = 45-59 mL/min/1 73 square meters)    Stage 3B Moderate CKD (GFR = 30-44 mL/min/1 73 square meters)    Stage 4 Severe CKD (GFR = 15-29 mL/min/1 73 square meters)    Stage 5 End Stage CKD (GFR <15 mL/min/1 73 square meters)  Note: GFR calculation is accurate only with a steady state creatinine    Lipase [[id number]]  (Normal) Collected:  03/07/20 1029    Lab Status:  Final result Specimen:  Blood from Arm, Left Updated:  03/07/20 1058     Lipase 186 u/L     CBC and differential [[id number]]  (Abnormal) Collected:  03/07/20 1029    Lab Status:  Final result Specimen:  Blood from Arm, Left Updated:  03/07/20 1038     WBC 7 73 Thousand/uL      RBC 4 36 Million/uL      Hemoglobin 11 6 g/dL      Hematocrit 38 7 %      MCV 89 fL      MCH 26 6 pg      MCHC 30 0 g/dL      RDW 14 6 %      MPV 13 2 fL      Platelets 361 Thousands/uL      nRBC 0 /100 WBCs      Neutrophils Relative 82 %      Immat GRANS % 0 %      Lymphocytes Relative 12 %      Monocytes Relative 4 %      Eosinophils Relative 1 %      Basophils Relative 1 %      Neutrophils Absolute 6 38 Thousands/µL      Immature Grans Absolute 0 01 Thousand/uL      Lymphocytes Absolute 0 90 Thousands/µL      Monocytes Absolute 0 32 Thousand/µL      Eosinophils Absolute 0 08 Thousand/µL      Basophils Absolute 0 04 Thousands/µL     POCT pregnancy, urine [[id number]]  (Normal) Resulted:  03/07/20 1000    Lab Status:  Final result Updated:  03/07/20 1000     EXT PREG TEST UR (Ref: Negative) negative     Control valid    POCT urinalysis dipstick [[id number]]  (Normal) Resulted:  03/07/20 1000    Lab Status:  Final result Updated:  03/07/20 1000     Color, UA yellow     Clarity, UA clear    Urine Macroscopic, POC [[id number]]  (Abnormal) Collected:  03/07/20 0957    Lab Status:  Final result Specimen:  Urine Updated:  03/07/20 0959     Color, UA Yellow     Clarity, UA Clear     pH, UA 5 5     Leukocytes, UA Negative     Nitrite, UA Negative     Protein, UA Negative mg/dl      Glucose, UA Negative mg/dl      Ketones, UA Trace mg/dl      Urobilinogen, UA 0 2 E U /dl      Bilirubin, UA Negative     Blood, UA Negative     Specific Gravity, UA >=1 030    Narrative:       CLINITEK RESULT                 CT abdomen pelvis with contrast Final Result by Ortiz Lowe MD (03/07 1214)   1  Diffuse wall thickening of the right, transverse, left and rectosigmoid colon, most concerning for colitis  Workstation performed: [id number]                    Procedures  Procedures         ED Course  ED Course as of Mar 07 1439   Sat Mar 07, 2020   1226 CT findings c/w colitis, no abscess, no diverticultis, no appendicitis   CT abdomen pelvis with contrast                               MDM      Disposition  Final diagnoses:   Colitis     Time reflects when diagnosis was documented in both MDM as applicable and the Disposition within this note     Time User Action Codes Description Comment    3/7/2020 12:28 PM Marya Altman Add [K52 9] Colitis       ED Disposition     ED Disposition Condition Date/Time Comment    Discharge Good Sat Mar 7, 2020 12:28 PM Eugenio Barragan discharge to home/self care  Follow-up Information     Follow up With Specialties Details Why Contact Info    [employer]  Call  For followup with primary care as needed [phone]            Discharge Medication List as of 3/7/2020 12:33 PM      START taking these medications    Details   dicyclomine (BENTYL) 20 mg tablet Take 1 tablet (20 mg total) by mouth 4 (four) times a day as needed (abdominal cramping, diarrhea), Starting Sat 3/7/2020, Print      ondansetron (ZOFRAN-ODT) 4 mg disintegrating tablet Take 1 tablet (4 mg total) by mouth every 6 (six) hours as needed for nausea or vomiting, Starting Sat 3/7/2020, Print           No discharge procedures on file      PDMP Review     None          ED Provider  Electronically Signed by           Sherron Parish MD  03/07/20 4680

## 2020-12-08 ENCOUNTER — HOSPITAL ENCOUNTER (EMERGENCY)
Facility: HOSPITAL | Age: 50
Discharge: HOME | End: 2020-12-08
Attending: EMERGENCY MEDICINE

## 2020-12-08 VITALS
HEART RATE: 77 BPM | SYSTOLIC BLOOD PRESSURE: 124 MMHG | DIASTOLIC BLOOD PRESSURE: 78 MMHG | WEIGHT: 123.9 LBS | TEMPERATURE: 97.5 F | OXYGEN SATURATION: 100 % | RESPIRATION RATE: 20 BRPM

## 2020-12-08 DIAGNOSIS — J06.9 URI (UPPER RESPIRATORY INFECTION): Primary | ICD-10-CM

## 2020-12-08 DIAGNOSIS — Z20.822 ENCOUNTER FOR LABORATORY TESTING FOR COVID-19 VIRUS: ICD-10-CM

## 2020-12-08 PROCEDURE — U0003 INFECTIOUS AGENT DETECTION BY NUCLEIC ACID (DNA OR RNA); SEVERE ACUTE RESPIRATORY SYNDROME CORONAVIRUS 2 (SARS-COV-2) (CORONAVIRUS DISEASE [COVID-19]), AMPLIFIED PROBE TECHNIQUE, MAKING USE OF HIGH THROUGHPUT TECHNOLOGIES AS DESCRIBED BY CMS-2020-01-R: HCPCS | Performed by: PHYSICIAN ASSISTANT

## 2020-12-08 PROCEDURE — 99284 EMERGENCY DEPT VISIT MOD MDM: CPT

## 2020-12-08 PROCEDURE — 99284 EMERGENCY DEPT VISIT MOD MDM: CPT | Performed by: EMERGENCY MEDICINE

## 2020-12-08 RX ORDER — BENZONATATE 100 MG/1
100 CAPSULE ORAL 3 TIMES DAILY PRN
Qty: 20 CAPSULE | Refills: 0 | Status: SHIPPED | OUTPATIENT
Start: 2020-12-08 | End: 2020-12-15

## 2020-12-10 LAB — SARS-COV-2 RNA SPEC QL NAA+PROBE: DETECTED

## 2021-04-12 ENCOUNTER — OFFICE VISIT (OUTPATIENT)
Dept: FAMILY MEDICINE CLINIC | Facility: CLINIC | Age: 51
End: 2021-04-12
Payer: COMMERCIAL

## 2021-04-12 VITALS
TEMPERATURE: 98 F | RESPIRATION RATE: 18 BRPM | OXYGEN SATURATION: 100 % | HEART RATE: 84 BPM | SYSTOLIC BLOOD PRESSURE: 110 MMHG | BODY MASS INDEX: 23.79 KG/M2 | HEIGHT: 61 IN | WEIGHT: 126 LBS | DIASTOLIC BLOOD PRESSURE: 72 MMHG

## 2021-04-12 DIAGNOSIS — Z86.16 PERSONAL HISTORY OF COVID-19: ICD-10-CM

## 2021-04-12 DIAGNOSIS — B02.9 HERPES ZOSTER WITHOUT COMPLICATION: ICD-10-CM

## 2021-04-12 DIAGNOSIS — R53.83 OTHER FATIGUE: Primary | ICD-10-CM

## 2021-04-12 DIAGNOSIS — E78.2 MIXED HYPERLIPIDEMIA: ICD-10-CM

## 2021-04-12 DIAGNOSIS — Z12.4 SCREENING FOR CERVICAL CANCER: ICD-10-CM

## 2021-04-12 DIAGNOSIS — M32.9 SYSTEMIC LUPUS ERYTHEMATOSUS, UNSPECIFIED SLE TYPE, UNSPECIFIED ORGAN INVOLVEMENT STATUS (HCC): ICD-10-CM

## 2021-04-12 DIAGNOSIS — Z11.4 SCREENING FOR HIV (HUMAN IMMUNODEFICIENCY VIRUS): ICD-10-CM

## 2021-04-12 DIAGNOSIS — Z12.31 ENCOUNTER FOR SCREENING MAMMOGRAM FOR MALIGNANT NEOPLASM OF BREAST: ICD-10-CM

## 2021-04-12 DIAGNOSIS — R73.9 HYPERGLYCEMIA: ICD-10-CM

## 2021-04-12 PROCEDURE — 3008F BODY MASS INDEX DOCD: CPT | Performed by: NURSE PRACTITIONER

## 2021-04-12 PROCEDURE — 1036F TOBACCO NON-USER: CPT | Performed by: NURSE PRACTITIONER

## 2021-04-12 PROCEDURE — 3725F SCREEN DEPRESSION PERFORMED: CPT | Performed by: NURSE PRACTITIONER

## 2021-04-12 PROCEDURE — 99204 OFFICE O/P NEW MOD 45 MIN: CPT | Performed by: NURSE PRACTITIONER

## 2021-04-12 RX ORDER — VALACYCLOVIR HYDROCHLORIDE 1 G/1
1000 TABLET, FILM COATED ORAL EVERY 8 HOURS
Qty: 21 TABLET | Refills: 0 | Status: SHIPPED | OUTPATIENT
Start: 2021-04-12 | End: 2021-06-07 | Stop reason: ALTCHOICE

## 2021-04-12 NOTE — PROGRESS NOTES
BMI Counseling: Body mass index is 23 81 kg/m²  The BMI is above normal  Nutrition recommendations include encouraging healthy choices of fruits and vegetables, decreasing fast food intake, consuming healthier snacks, limiting drinks that contain sugar, increasing intake of lean protein, reducing intake of saturated and trans fat and reducing intake of cholesterol  Exercise recommendations include exercising 3-5 times per week  BMI Counseling: Body mass index is 23 81 kg/m²  The BMI is below normal  Patient advised to gain weight  Depression Screening and Follow-up Plan: Patient's depression screening was positive with a PHQ-2 score of 0  Clincally patient does not have depression  No treatment is required  Assessment/Plan:    Other fatigue  -patient has been fatigued for a few months now  States since having COVID more fatigued  At times finds it difficult to catch a full breath and feeling tired  I am ordering a work up on patient to rule out cause of fatigue and a Lupus panel as well as she states she has a hx of lupus years ago  Herpes zoster without complication  -pt states since having COVID has had recurrent shingles rash that burns and hurts on her lower back  She understands that this happens under stress and thinks possibly due to work stress  I am starting on valtrex q 8 x 7 days  If s/s do not resolve to call office  Personal history of covid-19  -pt reports having COVID in 12/2020 and since then feeling fatigued and short of breath at times  Ordering a chest xray on patient  Her pulse ox in office is 100%  Lung sounds clear, no cough noted  I am recommending Physical therapy as well  She also verbalized she sense of smell has not returned since dec 2020  We discussed this taking some time to fully recover   Pt verbalized understanding and will schedule f/u with PT and pulmonologist      [facility]  -ordering work up to rule out SLE, as patient reports having a history of lupus in the past when living in [facility] but when she came to [place] was negative in 2018  Diagnoses and all orders for this visit:    Other fatigue  -     CBC and differential; Future  -     TSH, 3rd generation with Free T4 reflex; Future    Herpes zoster without complication  -     valACYclovir (VALTREX) 1,000 mg tablet; Take 1 tablet (1,000 mg total) by mouth every 8 (eight) hours for 7 days    Personal history of covid-19  -     XR chest pa & lateral; Future  -     Ambulatory referral to Physical Therapy; Future  -     Ambulatory referral to Pulmonology; Future    Systemic lupus erythematosus, unspecified SLE type, unspecified organ involvement status (HCC)  -     Lyme Total Antibody Profile with reflex to WB  -     JUAN CARLOS Screen w/ Reflex to Titer/Pattern  -     RF Screen w/ Reflex to Titer  -     Sjogren's Antibodies  -     Lupus Anticoagulant Panel; Future    Screening for cervical cancer  -     Ambulatory referral to Gynecology; Future    Screening for HIV (human immunodeficiency virus)  -     HIV 1/2 Antigen/Antibody (4th Generation) w Reflex [facility]; Future    Hyperglycemia  -     Comprehensive metabolic panel; Future  -     Hemoglobin A1C; Future    Mixed hyperlipidemia  -     Lipid panel; Future    Encounter for screening mammogram for malignant neoplasm of breast  -     Mammo screening bilateral w 3d & cad; Future          Subjective:      Patient ID: Sylwia Be is a 52 y o  female  52year old female patient here to [facility] care  States that she had Lupus in [facility] years ago  When she saw Dr Kelsea Hill 4 years ago had the workup for lupus and she was negative  PMHx: Had COVID in Dec 2020  States her respiration is very delayed easily fatigued  Since having COVID does not have sense of smell and breathing very slow and feeling tired  Has a history of shingles and has a rash of shingles per patient  States it hurts and burns per patient on her lower back   This appeared 1 week ago per patient per patient and this is recurrent since having COVID  The following portions of the patient's history were reviewed and updated as appropriate: allergies, current medications, past family history, past medical history, past social history, past surgical history and problem list     Review of Systems   Constitutional: Positive for fatigue (at times since having COVID 12/2020)  HENT: Negative  Loss of smell since 12/2020   Eyes: Negative  Respiratory: Positive for shortness of breath (at times with exertion)  Negative for cough, chest tightness and wheezing  Cardiovascular: Negative  Negative for chest pain and palpitations  Gastrointestinal: Negative  Endocrine: Negative  Genitourinary: Negative  Musculoskeletal: Negative  Skin: Positive for rash (lower back)  Allergic/Immunologic: Negative  Neurological: Positive for weakness  Negative for dizziness and headaches  Hematological: Negative  Psychiatric/Behavioral: Negative  Objective:      /72 (BP Location: Left arm, Patient Position: Sitting, Cuff Size: Standard)   Pulse 84   Temp 98 °F (36 7 °C) (Temporal)   Resp 18   Ht 5' 1" (1 549 m)   Wt 57 2 kg (126 lb)   LMP 03/06/2021   SpO2 100%   BMI 23 81 kg/m²          Physical Exam  Vitals signs and nursing note reviewed  Constitutional:       General: She is not in acute distress  Appearance: Normal appearance  She is not ill-appearing, toxic-appearing or diaphoretic  HENT:      Head: Normocephalic and atraumatic  Right Ear: External ear normal       Left Ear: External ear normal       Nose: Nose normal  No congestion or rhinorrhea  Mouth/Throat:      Pharynx: Oropharynx is clear  No oropharyngeal exudate  Eyes:      Conjunctiva/sclera: Conjunctivae normal    Neck:      Musculoskeletal: Normal range of motion and neck supple  Cardiovascular:      Rate and Rhythm: Normal rate and regular rhythm  Pulses: Normal pulses        Heart sounds: Normal heart sounds  Pulmonary:      Effort: Pulmonary effort is normal  No respiratory distress  Breath sounds: Normal breath sounds  Abdominal:      General: Bowel sounds are normal       Palpations: Abdomen is soft  Musculoskeletal: Normal range of motion  Skin:     General: Skin is warm and dry  Capillary Refill: Capillary refill takes less than 2 seconds  Findings: Erythema and rash present  Rash is papular  Comments: Lower back with blistering rash of papules noted  Tender to palpation  Neurological:      General: No focal deficit present  Mental Status: She is alert and oriented to person, place, and time     Psychiatric:         Mood and Affect: Mood normal          Behavior: Behavior normal            Chief Complaint   Patient presents with   [facility]

## 2021-04-12 NOTE — ASSESSMENT & PLAN NOTE
-pt reports having COVID in 12/2020 and since then feeling fatigued and short of breath at times  Ordering a chest xray on patient  Her pulse ox in office is 100%  Lung sounds clear, no cough noted  I am recommending Physical therapy as well  She also verbalized she sense of smell has not returned since dec 2020  We discussed this taking some time to fully recover   Pt verbalized understanding and will schedule f/u with PT and pulmonologist

## 2021-04-12 NOTE — ASSESSMENT & PLAN NOTE
-ordering work up to rule out SLE, as patient reports having a history of lupus in the past when living in [place] but when she came to [place] was negative in 2018

## 2021-04-12 NOTE — ASSESSMENT & PLAN NOTE
-patient has been fatigued for a few months now  States since having COVID more fatigued  At times finds it difficult to catch a full breath and feeling tired  I am ordering a work up on patient to rule out cause of fatigue and a Lupus panel as well as she states she has a hx of lupus years ago

## 2021-04-12 NOTE — ASSESSMENT & PLAN NOTE
-pt states since having COVID has had recurrent shingles rash that burns and hurts on her lower back  She understands that this happens under stress and thinks possibly due to work stress  I am starting on valtrex q 8 x 7 days  If s/s do not resolve to call office

## 2021-04-19 ENCOUNTER — APPOINTMENT (OUTPATIENT)
Dept: LAB | Facility: HOSPITAL | Age: 51
End: 2021-04-19
Payer: COMMERCIAL

## 2021-04-19 ENCOUNTER — IMMUNIZATIONS (OUTPATIENT)
Dept: FAMILY MEDICINE CLINIC | Facility: HOSPITAL | Age: 51
End: 2021-04-19
Payer: COMMERCIAL

## 2021-04-19 ENCOUNTER — HOSPITAL ENCOUNTER (OUTPATIENT)
Dept: RADIOLOGY | Facility: HOSPITAL | Age: 51
Discharge: HOME | End: 2021-04-19
Payer: COMMERCIAL

## 2021-04-19 DIAGNOSIS — R73.9 HYPERGLYCEMIA: ICD-10-CM

## 2021-04-19 DIAGNOSIS — Z86.16 PERSONAL HISTORY OF COVID-19: ICD-10-CM

## 2021-04-19 DIAGNOSIS — R53.83 OTHER FATIGUE: ICD-10-CM

## 2021-04-19 DIAGNOSIS — M32.9 SYSTEMIC LUPUS ERYTHEMATOSUS, UNSPECIFIED SLE TYPE, UNSPECIFIED ORGAN INVOLVEMENT STATUS (HCC): ICD-10-CM

## 2021-04-19 DIAGNOSIS — E78.2 MIXED HYPERLIPIDEMIA: ICD-10-CM

## 2021-04-19 DIAGNOSIS — Z23 ENCOUNTER FOR IMMUNIZATION: Primary | ICD-10-CM

## 2021-04-19 DIAGNOSIS — Z11.4 SCREENING FOR HIV (HUMAN IMMUNODEFICIENCY VIRUS): ICD-10-CM

## 2021-04-19 LAB
ALBUMIN SERPL BCP-MCNC: 3.8 G/DL (ref 3–5.2)
ALP SERPL-CCNC: 76 U/L (ref 43–122)
ALT SERPL W P-5'-P-CCNC: 13 U/L
ANION GAP SERPL CALCULATED.3IONS-SCNC: 5 MMOL/L (ref 5–14)
AST SERPL W P-5'-P-CCNC: 23 U/L (ref 14–36)
BASOPHILS # BLD AUTO: 0.1 THOUSANDS/ΜL (ref 0–0.1)
BASOPHILS NFR BLD AUTO: 1 % (ref 0–1)
BILIRUB SERPL-MCNC: 0.24 MG/DL
BUN SERPL-MCNC: 6 MG/DL (ref 5–25)
CALCIUM SERPL-MCNC: 8.8 MG/DL (ref 8.4–10.2)
CHLORIDE SERPL-SCNC: 105 MMOL/L (ref 97–108)
CHOLEST SERPL-MCNC: 178 MG/DL
CO2 SERPL-SCNC: 27 MMOL/L (ref 22–30)
CREAT SERPL-MCNC: 0.41 MG/DL (ref 0.6–1.2)
EOSINOPHIL # BLD AUTO: 0.2 THOUSAND/ΜL (ref 0–0.4)
EOSINOPHIL NFR BLD AUTO: 3 % (ref 0–6)
ERYTHROCYTE [DISTWIDTH] IN BLOOD BY AUTOMATED COUNT: 18 %
EST. AVERAGE GLUCOSE BLD GHB EST-MCNC: 114 MG/DL
GFR SERPL CREATININE-BSD FRML MDRD: 123 ML/MIN/1.73SQ M
GLUCOSE P FAST SERPL-MCNC: 92 MG/DL (ref 70–99)
HBA1C MFR BLD: 5.6 %
HCT VFR BLD AUTO: 36.2 % (ref 36–46)
HDLC SERPL-MCNC: 60 MG/DL
HGB BLD-MCNC: 11 G/DL (ref 12–16)
HYPERCHROMIA BLD QL SMEAR: PRESENT
LDLC SERPL CALC-MCNC: 98 MG/DL
LG PLATELETS BLD QL SMEAR: PRESENT
LYMPHOCYTES # BLD AUTO: 1.2 THOUSANDS/ΜL (ref 0.5–4)
LYMPHOCYTES NFR BLD AUTO: 17 % (ref 25–45)
MCH RBC QN AUTO: 23.7 PG (ref 26–34)
MCHC RBC AUTO-ENTMCNC: 30.5 G/DL (ref 31–36)
MCV RBC AUTO: 78 FL (ref 80–100)
MICROCYTES BLD QL AUTO: PRESENT
MONOCYTES # BLD AUTO: 0.5 THOUSAND/ΜL (ref 0.2–0.9)
MONOCYTES NFR BLD AUTO: 7 % (ref 1–10)
NEUTROPHILS # BLD AUTO: 5.1 THOUSANDS/ΜL (ref 1.8–7.8)
NEUTS SEG NFR BLD AUTO: 72 % (ref 45–65)
NONHDLC SERPL-MCNC: 118 MG/DL
PLATELET # BLD AUTO: 220 THOUSANDS/UL (ref 150–450)
PLATELET BLD QL SMEAR: ADEQUATE
PMV BLD AUTO: 12.5 FL (ref 8.9–12.7)
POTASSIUM SERPL-SCNC: 3.8 MMOL/L (ref 3.6–5)
PROT SERPL-MCNC: 7.5 G/DL (ref 5.9–8.4)
RBC # BLD AUTO: 4.66 MILLION/UL (ref 4–5.2)
RBC MORPH BLD: NORMAL
RHEUMATOID FACT SER QL LA: NEGATIVE
SODIUM SERPL-SCNC: 137 MMOL/L (ref 137–147)
TRIGL SERPL-MCNC: 99 MG/DL
TSH SERPL DL<=0.05 MIU/L-ACNC: 2.5 UIU/ML (ref 0.47–4.68)
WBC # BLD AUTO: 7.1 THOUSAND/UL (ref 4.5–11)

## 2021-04-19 PROCEDURE — 91301 SARS-COV-2 / COVID-19 MRNA VACCINE (MODERNA) 100 MCG: CPT

## 2021-04-19 PROCEDURE — 36415 COLL VENOUS BLD VENIPUNCTURE: CPT | Performed by: NURSE PRACTITIONER

## 2021-04-19 PROCEDURE — 86039 ANTINUCLEAR ANTIBODIES (ANA): CPT | Performed by: NURSE PRACTITIONER

## 2021-04-19 PROCEDURE — 85670 THROMBIN TIME PLASMA: CPT

## 2021-04-19 PROCEDURE — 86235 NUCLEAR ANTIGEN ANTIBODY: CPT | Performed by: NURSE PRACTITIONER

## 2021-04-19 PROCEDURE — 85384 FIBRINOGEN ACTIVITY: CPT

## 2021-04-19 PROCEDURE — 86038 ANTINUCLEAR ANTIBODIES: CPT | Performed by: NURSE PRACTITIONER

## 2021-04-19 PROCEDURE — 71046 X-RAY EXAM CHEST 2 VIEWS: CPT

## 2021-04-19 PROCEDURE — 86618 LYME DISEASE ANTIBODY: CPT | Performed by: NURSE PRACTITIONER

## 2021-04-19 PROCEDURE — 80061 LIPID PANEL: CPT

## 2021-04-19 PROCEDURE — 85635 REPTILASE TEST: CPT

## 2021-04-19 PROCEDURE — 85025 COMPLETE CBC W/AUTO DIFF WBC: CPT

## 2021-04-19 PROCEDURE — 84443 ASSAY THYROID STIM HORMONE: CPT

## 2021-04-19 PROCEDURE — 86430 RHEUMATOID FACTOR TEST QUAL: CPT | Performed by: NURSE PRACTITIONER

## 2021-04-19 PROCEDURE — 87389 HIV-1 AG W/HIV-1&-2 AB AG IA: CPT

## 2021-04-19 PROCEDURE — 80053 COMPREHEN METABOLIC PANEL: CPT

## 2021-04-19 PROCEDURE — 85385 FIBRINOGEN ANTIGEN: CPT

## 2021-04-19 PROCEDURE — 0011A SARS-COV-2 / COVID-19 MRNA VACCINE (MODERNA) 100 MCG: CPT

## 2021-04-19 PROCEDURE — 83036 HEMOGLOBIN GLYCOSYLATED A1C: CPT

## 2021-04-20 DIAGNOSIS — D64.9 ANEMIA, UNSPECIFIED TYPE: Primary | ICD-10-CM

## 2021-04-20 LAB
B BURGDOR IGG+IGM SER-ACNC: 12
ENA SS-A AB SER-ACNC: 4.4 AI (ref 0–0.9)
ENA SS-B AB SER-ACNC: <0.2 AI (ref 0–0.9)
HIV 1+2 AB+HIV1 P24 AG SERPL QL IA: NORMAL

## 2021-04-21 LAB
ANA HOMOGEN SER QL IF: NORMAL
ANA HOMOGEN TITR SER: NORMAL {TITER}
RYE IGE QN: POSITIVE

## 2021-04-23 DIAGNOSIS — R76.8 POSITIVE ANA (ANTINUCLEAR ANTIBODY): Primary | ICD-10-CM

## 2021-04-26 ENCOUNTER — APPOINTMENT (OUTPATIENT)
Dept: LAB | Facility: HOSPITAL | Age: 51
End: 2021-04-26
Payer: COMMERCIAL

## 2021-04-26 DIAGNOSIS — D64.9 ANEMIA, UNSPECIFIED TYPE: ICD-10-CM

## 2021-04-26 LAB
FERRITIN SERPL-MCNC: 5 NG/ML (ref 8–388)
IRON SERPL-MCNC: 21 UG/DL (ref 50–170)
TIBC SERPL-MCNC: 364 UG/DL (ref 250–450)

## 2021-04-26 PROCEDURE — 83550 IRON BINDING TEST: CPT

## 2021-04-26 PROCEDURE — 82728 ASSAY OF FERRITIN: CPT

## 2021-04-26 PROCEDURE — 36415 COLL VENOUS BLD VENIPUNCTURE: CPT

## 2021-04-26 PROCEDURE — 83540 ASSAY OF IRON: CPT

## 2021-04-27 DIAGNOSIS — D50.8 OTHER IRON DEFICIENCY ANEMIA: Primary | ICD-10-CM

## 2021-04-27 RX ORDER — FERROUS SULFATE TAB EC 324 MG (65 MG FE EQUIVALENT) 324 (65 FE) MG
324 TABLET DELAYED RESPONSE ORAL
Qty: 60 TABLET | Refills: 5 | Status: CANCELLED | OUTPATIENT
Start: 2021-04-27

## 2021-05-03 ENCOUNTER — EVALUATION (OUTPATIENT)
Dept: PHYSICAL THERAPY | Facility: REHABILITATION | Age: 51
End: 2021-05-03
Payer: COMMERCIAL

## 2021-05-03 DIAGNOSIS — Z86.16 PERSONAL HISTORY OF COVID-19: ICD-10-CM

## 2021-05-03 DIAGNOSIS — R53.81 PHYSICAL DECONDITIONING: Primary | ICD-10-CM

## 2021-05-03 DIAGNOSIS — R53.83 OTHER FATIGUE: ICD-10-CM

## 2021-05-03 PROCEDURE — 97112 NEUROMUSCULAR REEDUCATION: CPT

## 2021-05-03 PROCEDURE — 97162 PT EVAL MOD COMPLEX 30 MIN: CPT

## 2021-05-03 NOTE — PROGRESS NOTES
PT Evaluation                                                                           5/3/2021  Saad Jaimes  : 1970  MRN: [id number]  Home:   Past Medical History:   Diagnosis Date    Colitis     COVID-19     Gluten free diet       Mobile: [phone] (mobile)  Insurance Information: Payor: Iker Sifuentes / Plan: [employer]AN 361 / Product Type: Blue Fee for Service /   Referring Provider: ABHINAV Guillory    Problem List  Patient Active Problem List   Diagnosis    Other fatigue    Herpes zoster without complication    Personal history of COVID-19    Screening for cervical cancer    [facility] lupus erythematosus ([facility] )    Screening for HIV (human immunodeficiency virus)    Hyperglycemia    Mixed hyperlipidemia    Encounter for screening mammogram for malignant neoplasm of breast       Past Medical History  Past Medical History:   Diagnosis Date    Colitis     COVID-19     Gluten free diet        Past Surgical History  Past Surgical History:   Procedure Laterality Date     SECTION           Subjective    HPI: Saad Jaimes is a 52 y o  [place] female referred to outpatient physical therapy for generalized deconditioning due to Covid-19  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, she went to ER and was positive for COVID 19  She reports was unable to walk from her bedroom to the bathroom approximately 10 feet due to SOB and generalized weakness  She was returned to work post one month and reports continued fatigue  Her job is mostly standing  And only has one 15 minutes brake  She gets home and goes to bed  She is unable to complete her shores requires extended time during partial completion and her  is performing 80% of house shores       Home Environment: First floor apartment  Lives with   Pain: Reports no pain       Patient Goal: See below goals  Patient-Specific Functional Scale   Task is scored 0 (unable to perform activity) to 10 (ability to perform activity independently)  Activity      1  I want to be able to vacuum my house  4    2  Perform laundry  4    3  Negotiate steps without SOB   4    4  I want to be able to work without getting as tired  4           Objective    Vitals: position sittng  - BP  118/78  - HR, 79  - P02:  97%       Breathing evaluation: Sitting    Describe breathing: via: room air  Breathing pattern: WNL  Capillary refill: _1__ seconds  Nasal flaring:absent     02 saturation:  Seated: 97%  Standin%  With activity:93%    Rib cage expansion/Chest wall mobility: Decreased abdominal excursion  Is chest wall expansion symmetrical? Yes    Coordination Left Right   Heel To Shin normal normal   Finger To Nose normal normal   Rapid Alternating Movement normal normal       Sensation Left :  Intact Right : Intact        Left Right   AROM Shoulder WNL WNL   Elbow WNL WNL   Hand WNL WNL     Muscle Strength Shoulder Left Right   Flexion 4 4   Extension 4 4   Abduction 4 4   External Rotation 4 4   Elbow     Flex 4 4   Ext 4 4   supination 4 4   Pronation 4 4   Hand    4 4     AROM   Hip Left Right   Flexion [facility] WFL   Extension [facility] WFL   Abduction [facility]   External Rotation [facility] WF     AROM   Knee Left Right   Flexion [facility] WFL   Extension [facility] WFL     AROM   Ankle Left Right   Doriflexion [facility] WFL   Plantarflexion [facility]       Manual Muscle Testing - Hip Left Right   Flexion 3+ 3+   Extension 3+ 3+   Abduction 3+ 3+   External Rotation 3+ 3+     Manual Muscle Testing - Knee Left Right   Flexion 4 4   Extension 4 4     Manual Muscle Testing - Ankle Left Right   Doriflexion 4 4   Plantarflexion 4 4          Core Movement Tasks Description of task    Sitting Normal   Sitting to Standing Other: difficulty with descent and uses back of knee against chair to stand for support   Standing Normal   Walking Decreased arm swing, decreased chris, decreased trunk rotation, guarded gait pattern   Step-up Step over step, SOB at top of stairs SPO2 94%   Reach, grasp, manipulate Normal         Outcome Measures    6 Minute Walk Test (ft):  1090 feet,  7RPE,    6 Dyspnea,  88 HR,   94SPO2,  126/86BP   10MWT 1 11 meters/second   5x Sit To Stand (s):   13 58 sec   TUG: 10 sec         Functional Gait Assessment  2 Gait level surface  3 Change in gait speed  2 Gait with horizontal head turns  3 Gait with vertical head turns  3 Gait and pivot turn  2 Step over obstacle  2 Gait with narrow base of support  2 Gait with eyes closed  2 Ambulating backwards  3 Steps  24/3- Total score      SLUMS:23/30    Assessment/Plan  Assessment:  Patient presents to [address] [place] [address] for physical therapy s/p prolonged inmobilization , sob and loss of work hours  She presents with core muscle weakness, decreased extremity strength,  decreased endurance with desaturation with increased activity as well as increased dyspnea and PRE as evidenced by results of functional outcome measures: 6 MWT, TUG, 6 x sit to stand, FGA  Boone Fang has gait deviations including those listed in observation section  These impairments are limiting the patient's ability to participate in ADLs/IADLs and being able to participate in job related demands 100%  Patient will benefit from skilled outpatient physical therapy to address the above impairments in order to improve patient independence and safety in home and the community  Client reports being forgetful sometimes and she did score 23/30 in the [place] assessment which puts her art  Mild Cog deficits  At this time she reports having financial restraints which will limit rehabilitation visits  Initial home exercise program was developed to include proper breathing techniques as well as LE strengthening  Patient required resting periods during performance of exercises  Walking program and use of stationary bicycles was also discussed as part of her Physical activity  Recommendation: Speech and language Eval for Cognitive assessment if client agrees  STG's: 1 month    - Patient will demonstrate improved quality of breathing pattern with diaphragmatic engagement  2weeks    - Patient is independent with initial home exercise program for improvements at home within 2 weeks    - Patient ambulates for 8 consecutive min with appropriate vital sign response, dyspnea scale  and PRE levels for improved endurance within 4 weeks    - Patient will improve score on FGA by 5 for improved dynamic balance    - Patient will be able to negotiate steps without increased SOB    - Patient will demonstrate improvement in MS by 1 grade    -Patient will improve PSFS by 15 points   - Patient decreased time to complete TUG by 2 seconds for decreased risk of falls   LTG's: 2 months   - Patient improves distance ambulated on 6MWT by 40% within  2 months for improved endurance   - Patient improves gait speed to within 10% of age matched norms within  8 weeks   - Patient will be able to walk on the treadmill more than 10 minutes with appropriate RPE/dyspnea scale level   - Patient will perform steps Indepenendtly     Plan:  Patient will benefit from skilled outpatient physical therapy 1 x/wk for  8 wk  Therapeutic exercises, Neuromuscular re-education, therapeutic activities, gait training, manual therapy, patient and family education and development of HEP      Precautions: SOB   Neuro Re-Ed   5/03         Home Exercise Program   Developed, Written Program Issued to Client      Diaphragmatic breathing  supine and sitting  8 minutes         Bridges       SLR Flex 10 x       SLR Abd 10 x      Standing hip flex nv         Hip ext 10 x 2  Required sitting breaks between sets      Hip abd  10x 2   "         Heel Raises       Mini squatts   10 x 2         5x sit to stand   x 2          Row  nv          Low Rows  nv          Horizontal Abduction            Endurance  SciFit/Bike/Treadmill  6MWT Will advance to:  Recommended Intensity Dyspnea Scale: 3-5 (moderate to heavy)  Rate of perceived exertion:3-4/10 (moderate to heavy)      HR: Flora Gardner Minutes Incline/LVL RPE Dyspnea Heart Rate PO2 BP   1 min 0%        2 min 1%        3min  2%        4 min  3%        5 min  4%        6 min 5%        7 min 6%        8 min 7%        9 min 8%        10 min 9%        11 min 10%        12 min 11%        13 min 12%        14 min 13%        15 min  14%        [facility]    6/22/21   Patient had 2 no shows post initial evaluation  Follow up call was made  She reports that she is back to normal, has a lot of work and would like to d/c PT  Will resolve episode at this time

## 2021-05-10 ENCOUNTER — OFFICE VISIT (OUTPATIENT)
Dept: PULMONOLOGY | Facility: CLINIC | Age: 51
End: 2021-05-10
Payer: COMMERCIAL

## 2021-05-10 VITALS
HEART RATE: 70 BPM | SYSTOLIC BLOOD PRESSURE: 112 MMHG | BODY MASS INDEX: 23.79 KG/M2 | TEMPERATURE: 98 F | DIASTOLIC BLOOD PRESSURE: 60 MMHG | HEIGHT: 61 IN | WEIGHT: 126 LBS | OXYGEN SATURATION: 99 % | RESPIRATION RATE: 18 BRPM

## 2021-05-10 DIAGNOSIS — R76.8 POSITIVE ANA (ANTINUCLEAR ANTIBODY): ICD-10-CM

## 2021-05-10 DIAGNOSIS — R06.00 DOE (DYSPNEA ON EXERTION): ICD-10-CM

## 2021-05-10 DIAGNOSIS — M32.9 SYSTEMIC LUPUS ERYTHEMATOSUS, UNSPECIFIED SLE TYPE, UNSPECIFIED ORGAN INVOLVEMENT STATUS (HCC): ICD-10-CM

## 2021-05-10 DIAGNOSIS — Z86.16 PERSONAL HISTORY OF COVID-19: ICD-10-CM

## 2021-05-10 DIAGNOSIS — R53.83 OTHER FATIGUE: Primary | ICD-10-CM

## 2021-05-10 PROCEDURE — 99244 OFF/OP CNSLTJ NEW/EST MOD 40: CPT | Performed by: INTERNAL MEDICINE

## 2021-05-10 NOTE — ASSESSMENT & PLAN NOTE
Could be prolonged fatigue post COVID infection versus rheumatological disease most likely    Will check labs as below including CRP and anti DS DNA

## 2021-05-10 NOTE — ASSESSMENT & PLAN NOTE
Resolved completely with no infiltrates on most recent chest x-ray   patient received the 1st COVID-19 vaccination and due for the 2nd in a week

## 2021-05-10 NOTE — ASSESSMENT & PLAN NOTE
Very minimal, could be more fatigue than shortness of breath, no evidence of pulmonary disease but I will check PFT  In the future may consider trying inhalers for possible reactivation of her asthma although less likely with no other symptoms

## 2021-05-10 NOTE — ASSESSMENT & PLAN NOTE
Not sure if she has lupus but could be only positive JUAN CARLOS  Follow with rheumatology    Will check anti double strand DNA antibody

## 2021-05-10 NOTE — ASSESSMENT & PLAN NOTE
Chronic positive JUAN CARLOS, intermittent as per patient, positive SSA, check anti DS DNA and follow with   rheumatology

## 2021-05-10 NOTE — PROGRESS NOTES
Consultation - Pulmonary Medicine   Alcides Ellis 52 y o  female MRN: [id number]    Physician Requesting Consult: Shandra Bar  Reason for Consult: covid 19 pneumonia, SOB    Personal history of COVID-19    Resolved completely with no infiltrates on most recent chest x-ray   patient received the 1st COVID-19 vaccination and due for the 2nd in a week    WHITEHEAD (dyspnea on exertion)   Very minimal, could be more fatigue than shortness of breath, no evidence of pulmonary disease but I will check PFT  In the future may consider trying inhalers for possible reactivation of her asthma although less likely with no other symptoms  Other fatigue    Could be prolonged fatigue post COVID infection versus rheumatological disease most likely  Will check labs as below including CRP and anti DS DNA    [facility]    Not sure if she has lupus but could be only positive JUAN CARLOS  Follow with rheumatology  Will check anti double strand DNA antibody    Positive JUAN CARLOS (antinuclear antibody)   Chronic positive JUAN CARLOS, intermittent as per patient, positive SSA, check anti DS DNA and follow with   rheumatology      Diagnoses and all orders for this visit:    Other fatigue  -     Anti-DNA antibody, double-stranded; Future  -     C-reactive protein; Future    Personal history of COVID-19  -     Ambulatory referral to Pulmonology    Systemic lupus erythematosus, unspecified SLE type, unspecified organ involvement status ([facility] [place] [address] )  -     Anti-DNA antibody, double-stranded; Future  -     C-reactive protein; Future    Positive JUAN CARLOS (antinuclear antibody)  -     Anti-DNA antibody, double-stranded; Future  -     C-reactive protein; Future    WHITEHEAD (dyspnea on exertion)  -     Complete PFT with post bronchodilator; Future      ______________________________________________________________________    HPI:    Alcides Ellis is a 52 y o  female who presents  For evaluation of dyspnea on exertion and fatigue     patient had COVID-23 infection December 2020, she had some shortness of breath and fatigue and went to the emergency room and was diagnosed with COVID-19 but she did not have hypoxia or other significant symptoms and was discharged home without any specific treatment  She recovered completely but continued to have fatigue and some dyspnea on exertion  Denies wheezing, denies cough or sputum production, denies chest pain, denies fever or chills or night sweats, denies weight loss  She denies any difficulty swallowing or GERD, denies allergies  Patient has history of childhood asthma but she recovered completely at age 17  She is not on any inhalers  Patient has positive JUAN CARLOS, and in the past her JUAN CARLOS was positive the negative on different occasions  She has some arthralgias in multiple joints, denies Raynaud's,  She denies any skin rash or facial rash, she said she gets some edema specially in her legs when she is exposed to the  Some light for long time  She has an appointment with Rheumatology in late August  She denies history of miscarriages  Her sister had lupus  And she passed away  Patient lives at home with her , no pets at home, never smoked cigarettes, 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  Review of Systems:  Review of Systems   Constitutional: Positive for fatigue  HENT: Negative  Eyes: Negative  Respiratory: Positive for shortness of breath  Cardiovascular: Negative  Gastrointestinal: Negative  Endocrine: Negative  Genitourinary: Negative  Musculoskeletal: Negative  Skin: Negative  Allergic/Immunologic: Negative  Neurological: Negative  Hematological: Negative  Psychiatric/Behavioral: Negative  Aside from what is mentioned in the HPI, the review of systems otherwise negative      Current Medications:    Current Outpatient Medications:     dicyclomine (BENTYL) 20 mg tablet, Take 1 tablet (20 mg total) by mouth 4 (four) times a day as needed (abdominal cramping, diarrhea) (Patient not taking: Reported on 2021), Disp: 12 tablet, Rfl: 0    ondansetron (ZOFRAN-ODT) 4 mg disintegrating tablet, Take 1 tablet (4 mg total) by mouth every 6 (six) hours as needed for nausea or vomiting (Patient not taking: Reported on 2021), Disp: 8 tablet, Rfl: 0    valACYclovir (VALTREX) 1,000 mg tablet, Take 1 tablet (1,000 mg total) by mouth every 8 (eight) hours for 7 days, Disp: 21 tablet, Rfl: 0    Historical Information   Past Medical History:   Diagnosis Date    Colitis     COVID-19     Gluten free diet      Past Surgical History:   Procedure Laterality Date     SECTION       Social History   Social History     Tobacco Use   Smoking Status Never Smoker   Smokeless Tobacco Never Used       Occupational history:   no occupational exposure    Family History:   History reviewed  No pertinent family history  SLE in her sister      PhysicalExamination:  Vitals:   /60   Pulse 70   Temp 98 °F (36 7 °C)   Resp 18   Ht 5' 1" (1 549 m)   Wt 57 2 kg (126 lb)   SpO2 99%   BMI 23 81 kg/m²     General: alert, not in acute distress  HEENT: PERRL, no icteric sclera or cyanosis, no thrush  Neck:  Supple, no lymphadenopathy or thyromegaly, no JVD  Lungs:  Equal breath sounds and clear auscultations bilaterally, no wheezing or crackles  Heart: S1S2 regular, no murmures or gallops  Abdomen: soft, non-tender, bowel sounds  present  Extrimities: no edema, no clubbing or cyanosis  Neuro: Alert and oriented x 3, no focal neurodeficits   Skin: intact, no rashes      Diagnostic Data:  Labs:   I personally reviewed the most recent laboratory data pertinent to today's visit    Lab Results   Component Value Date    WBC 7 10 2021    HGB 11 0 (L) 2021    HCT 36 2 2021    MCV 78 (L) 2021     2021     Lab Results   Component Value Date    CALCIUM 8 8 2021    K 3 8 2021    CO2 27 2021  04/19/2021    BUN 6 04/19/2021    CREATININE 0 41 (L) 04/19/2021     No results found for: IGE  Lab Results   Component Value Date    ALT 13 04/19/2021    AST 23 04/19/2021    ALKPHOS 76 04/19/2021     COVID-19 PCR positive 12/18/2020    JUAN CARLOS positive 1:160 4/19/2021 speckled pattern    SSA positive slightly 4 4    CCP-Ab, RF, SSB, lupus anticoagulant negative    TSH 2 5      PFT results: The most recent pulmonary function tests were reviewed        Imaging:  I personally reviewed the images on the [facility] system pertinent to today's visit  Chest x-ray reviewed on PACs:  Clear lungs but slightly hyperinflated        Sera White MD

## 2021-05-18 ENCOUNTER — IMMUNIZATIONS (OUTPATIENT)
Dept: FAMILY MEDICINE CLINIC | Facility: HOSPITAL | Age: 51
End: 2021-05-18

## 2021-05-18 DIAGNOSIS — Z23 ENCOUNTER FOR IMMUNIZATION: Primary | ICD-10-CM

## 2021-05-18 PROCEDURE — 91301 SARS-COV-2 / COVID-19 MRNA VACCINE (MODERNA) 100 MCG: CPT

## 2021-05-18 PROCEDURE — 0012A SARS-COV-2 / COVID-19 MRNA VACCINE (MODERNA) 100 MCG: CPT

## 2021-05-24 ENCOUNTER — APPOINTMENT (OUTPATIENT)
Dept: LAB | Facility: HOSPITAL | Age: 51
End: 2021-05-24
Attending: INTERNAL MEDICINE
Payer: COMMERCIAL

## 2021-05-24 DIAGNOSIS — R53.83 OTHER FATIGUE: ICD-10-CM

## 2021-05-24 DIAGNOSIS — R76.8 POSITIVE ANA (ANTINUCLEAR ANTIBODY): ICD-10-CM

## 2021-05-24 DIAGNOSIS — M32.9 SYSTEMIC LUPUS ERYTHEMATOSUS, UNSPECIFIED SLE TYPE, UNSPECIFIED ORGAN INVOLVEMENT STATUS (HCC): ICD-10-CM

## 2021-05-24 LAB — CRP SERPL QL: <5 MG/L

## 2021-05-24 PROCEDURE — 36415 COLL VENOUS BLD VENIPUNCTURE: CPT

## 2021-05-24 PROCEDURE — 86225 DNA ANTIBODY NATIVE: CPT

## 2021-05-24 PROCEDURE — 86140 C-REACTIVE PROTEIN: CPT

## 2021-05-25 LAB — DSDNA AB SER-ACNC: 8 IU/ML (ref 0–9)

## 2021-06-07 ENCOUNTER — OFFICE VISIT (OUTPATIENT)
Dept: FAMILY MEDICINE CLINIC | Facility: CLINIC | Age: 51
End: 2021-06-07
Payer: COMMERCIAL

## 2021-06-07 VITALS
HEART RATE: 73 BPM | BODY MASS INDEX: 23.98 KG/M2 | RESPIRATION RATE: 16 BRPM | TEMPERATURE: 97.9 F | HEIGHT: 61 IN | WEIGHT: 127 LBS | SYSTOLIC BLOOD PRESSURE: 126 MMHG | OXYGEN SATURATION: 98 % | DIASTOLIC BLOOD PRESSURE: 70 MMHG

## 2021-06-07 DIAGNOSIS — M79.602 LEFT ARM PAIN: ICD-10-CM

## 2021-06-07 DIAGNOSIS — Z00.01 ENCOUNTER FOR GENERAL ADULT MEDICAL EXAMINATION WITH ABNORMAL FINDINGS: ICD-10-CM

## 2021-06-07 DIAGNOSIS — D50.9 IRON DEFICIENCY ANEMIA, UNSPECIFIED IRON DEFICIENCY ANEMIA TYPE: ICD-10-CM

## 2021-06-07 DIAGNOSIS — Z00.00 ANNUAL PHYSICAL EXAM: Primary | ICD-10-CM

## 2021-06-07 DIAGNOSIS — Z12.4 CERVICAL CANCER SCREENING: ICD-10-CM

## 2021-06-07 PROBLEM — R73.9 HYPERGLYCEMIA: Status: RESOLVED | Noted: 2021-04-12 | Resolved: 2021-06-07

## 2021-06-07 PROBLEM — R53.83 OTHER FATIGUE: Status: RESOLVED | Noted: 2021-04-12 | Resolved: 2021-06-07

## 2021-06-07 PROBLEM — Z12.31 ENCOUNTER FOR SCREENING MAMMOGRAM FOR MALIGNANT NEOPLASM OF BREAST: Status: RESOLVED | Noted: 2021-04-12 | Resolved: 2021-06-07

## 2021-06-07 PROBLEM — E78.2 MIXED HYPERLIPIDEMIA: Status: RESOLVED | Noted: 2021-04-12 | Resolved: 2021-06-07

## 2021-06-07 PROCEDURE — 99396 PREV VISIT EST AGE 40-64: CPT | Performed by: FAMILY MEDICINE

## 2021-06-07 PROCEDURE — 3008F BODY MASS INDEX DOCD: CPT | Performed by: FAMILY MEDICINE

## 2021-06-07 PROCEDURE — 1036F TOBACCO NON-USER: CPT | Performed by: FAMILY MEDICINE

## 2021-06-07 RX ORDER — FERROUS SULFATE TAB EC 324 MG (65 MG FE EQUIVALENT) 324 (65 FE) MG
324 TABLET DELAYED RESPONSE ORAL
Qty: 60 TABLET | Refills: 5 | Status: SHIPPED | OUTPATIENT
Start: 2021-06-07

## 2021-06-07 NOTE — PROGRESS NOTES
ADULT ANNUAL [address] [facility] PR[facility]    NAME: Reanna Ambriz  AGE: 52 y o  SEX: female  : 1970     DATE: 2021     Assessment and Plan:     Patient needs to have stool test for iron lost   She has iron deficiency anemia  She did not start iron supplement  Her ferritin is very low and can be the cause her fatigue  Her complains of left arm pain and hand numbness is possible work related  Patient is not able to work until Tuesday Francheska 15, 2021  She was instructed to follow up with gyn for her annual gynecological exam   She will follow-up with rheumatologist on late August   I did not find any lupus test the can give us diagnosis of systemic erythematosus lupus  She has only one JUAN CARLOS positive which is very un-specific  I believe her complains are of deconditioning and work stress related  I do not believe that she has any organic disorder at this time  I will see her again in one year for her annual physical exam   Problem List Items Addressed This Visit        Other    Cervical cancer screening    Relevant Orders    Ambulatory referral to Obstetrics / Gynecology    Encounter for general adult medical examination with abnormal findings - Primary    Left arm pain    Iron deficiency anemia    Relevant Medications    ferrous sulfate 324 (65 Fe) mg    Other Relevant Orders    CBC and differential    TIBC    Iron    Iron Saturation %    Ferritin    Occult Blood, Fecal Immunochemical        Spirometry show low vital capacity possible due to restriction  However FEV1/FVC percentage is 82% 97% of predicted  This since to me a normal spirometry  Immunizations and preventive care screenings were discussed with patient today  Appropriate education was printed on patient's after visit summary      Counseling:  Alcohol/drug use: discussed moderation in alcohol intake, the recommendations for healthy alcohol use, and avoidance of illicit drug use  Dental Health: discussed importance of regular tooth brushing, flossing, and dental visits  Injury prevention: discussed safety/seat belts, safety helmets, smoke detectors, carbon dioxide detectors, and smoking near bedding or upholstery  Sexual health: discussed sexually transmitted diseases, partner selection, use of condoms, avoidance of unintended pregnancy, and contraceptive alternatives  · Exercise: the importance of regular exercise/physical activity was discussed  Recommend exercise 3-5 times per week for at least 30 minutes  Return in 1 year (on 6/7/2022)  Chief Complaint:     Chief Complaint   Patient presents with    Physical Exam    Anemia      History of Present Illness:     Adult Annual Physical   Patient here for a comprehensive physical exam  The patient reports problems - left arm pain, fatigue from too many hours of eork  No SOB       Diet and Physical Activity  · Diet/Nutrition: well balanced diet  · Exercise: no formal exercise  Depression Screening  PHQ-9 Depression Screening    PHQ-9:   Frequency of the following problems over the past two weeks:           General Health  · Sleep: gets 4-6 hours of sleep on average and unrefreshing sleep  · Hearing: normal - bilateral   · Vision: no vision problems  · Dental: no dental visits for >1 year  /GYN Health  · Patient is: premenopausal  · Last menstrual period: 05/12/2021  · Contraceptive method: tubal ligation  Review of Systems:     Review of Systems   Constitutional: Positive for fatigue  Negative for diaphoresis, fever and unexpected weight change  Respiratory: Negative for cough, chest tightness, shortness of breath and wheezing  Cardiovascular: Negative for chest pain, palpitations and leg swelling  Gastrointestinal: Negative for abdominal pain, blood in stool and constipation  Musculoskeletal: Positive for arthralgias (of left arm)     Neurological: Negative for dizziness, syncope, light-headedness and headaches  Hematological: Does not bruise/bleed easily  Psychiatric/Behavioral: Negative for behavioral problems, self-injury and sleep disturbance  The patient is not nervous/anxious  Past Medical History:     Past Medical History:   Diagnosis Date    Colitis     Gluten free diet       Past Surgical History:     Past Surgical History:   Procedure Laterality Date     SECTION        Social History:     E-Cigarette/Vaping    E-Cigarette Use Never User      E-Cigarette/Vaping Substances    Nicotine No     THC No     CBD No     Flavoring No     Other No     Unknown No      Social History     Socioeconomic History    Marital status: Single     Spouse name: None    Number of children: None    Years of education: None    Highest education level: None   Occupational History    None   Social Needs    Financial resource strain: None    Food insecurity     Worry: None     Inability: None    Transportation needs     Medical: None     Non-medical: None   Tobacco Use    Smoking status: Never Smoker    Smokeless tobacco: Never Used   Substance and Sexual Activity    Alcohol use: No    Drug use: No    Sexual activity: Yes     Partners: Male     Birth control/protection: None   Lifestyle    Physical activity     Days per week: None     Minutes per session: None    Stress: None   Relationships    Social connections     Talks on phone: None     Gets together: None     Attends Evangelical service: None     Active member of club or organization: None     Attends meetings of clubs or organizations: None     Relationship status: None    Intimate partner violence     Fear of current or ex partner: None     Emotionally abused: None     Physically abused: None     Forced sexual activity: None   Other Topics Concern    None   Social History Narrative    None      Family History:     History reviewed  No pertinent family history     Current Medications:     Current Outpatient Medications   Medication Sig Dispense Refill    ferrous sulfate 324 (65 Fe) mg Take 1 tablet (324 mg total) by mouth 2 (two) times a day before meals 60 tablet 5     No current facility-administered medications for this visit  Allergies:     No Known Allergies   Physical Exam:     /70 (BP Location: Left arm, Patient Position: Sitting, Cuff Size: Standard)   Pulse 73   Temp 97 9 °F (36 6 °C) (Temporal)   Resp 16   Ht 5' 1" (1 549 m)   Wt 57 6 kg (127 lb)   SpO2 98%   Breastfeeding No   BMI 24 00 kg/m²     Physical Exam  Constitutional:       Appearance: Normal appearance  HENT:      Head: Normocephalic and atraumatic  Right Ear: Tympanic membrane normal       Left Ear: Tympanic membrane normal       Nose: Nose normal    Eyes:      Extraocular Movements: Extraocular movements intact  Conjunctiva/sclera: Conjunctivae normal       Pupils: Pupils are equal, round, and reactive to light  Neck:      Musculoskeletal: Normal range of motion  No neck rigidity or muscular tenderness  Vascular: No carotid bruit  Cardiovascular:      Rate and Rhythm: Normal rate and regular rhythm  Heart sounds: Normal heart sounds  No murmur  Pulmonary:      Effort: Pulmonary effort is normal  No respiratory distress  Breath sounds: Normal breath sounds  Comments: Office Spirometry Results:     Office Spirometry Results:   FVC: 2 34  FEV1:1 94  FVE1/FVC: 82 8    Abdominal:      General: Abdomen is flat  Bowel sounds are normal       Palpations: Abdomen is soft  Musculoskeletal: Normal range of motion  Lymphadenopathy:      Cervical: No cervical adenopathy  Skin:     General: Skin is dry  Neurological:      General: No focal deficit present     Psychiatric:         Mood and Affect: Mood normal           Eboni Lange MD  [address]

## 2021-06-07 NOTE — PATIENT INSTRUCTIONS

## 2021-06-08 ENCOUNTER — OFFICE VISIT (OUTPATIENT)
Dept: OBGYN CLINIC | Facility: MEDICAL CENTER | Age: 51
End: 2021-06-08
Payer: COMMERCIAL

## 2021-06-08 VITALS — BODY MASS INDEX: 23.62 KG/M2 | DIASTOLIC BLOOD PRESSURE: 80 MMHG | SYSTOLIC BLOOD PRESSURE: 110 MMHG | WEIGHT: 125 LBS

## 2021-06-08 DIAGNOSIS — Z12.4 SCREENING FOR CERVICAL CANCER: ICD-10-CM

## 2021-06-08 DIAGNOSIS — Z01.419 ENCOUNTER FOR GYNECOLOGICAL EXAMINATION WITH PAPANICOLAOU SMEAR OF CERVIX: Primary | ICD-10-CM

## 2021-06-08 PROCEDURE — S0610 ANNUAL GYNECOLOGICAL EXAMINA: HCPCS | Performed by: OBSTETRICS & GYNECOLOGY

## 2021-06-08 PROCEDURE — G0145 SCR C/V CYTO,THINLAYER,RESCR: HCPCS | Performed by: OBSTETRICS & GYNECOLOGY

## 2021-06-08 PROCEDURE — 87624 HPV HI-RISK TYP POOLED RSLT: CPT | Performed by: OBSTETRICS & GYNECOLOGY

## 2021-06-08 NOTE — PROGRESS NOTES
ASSESSMENT & PLAN: Katie Cantu is a 52 y o  [id number] with normal gynecologic exam     1   Routine well woman exam done today  2  Pap and HPV:  The patient's last pap and hpv was unknown   It was normal     Pap and cotesting was done today  Current ASCCP Guidelines reviewed  3   Mammogram ordered  4  The following were reviewed in today's visit: breast self exam, menopause, adequate intake of calcium and vitamin D, exercise and healthy diet      CC:  Annual Gynecologic Examination    HPI: Katie Cantu is a 52 y o  [id number] who presents for annual gynecologic examination  She has the following concerns:  None     Health Maintenance:    She wears her seatbelt routinely  She does perform regular monthly self breast exams  She feels safe at home  Patient Active Problem List   Diagnosis    Herpes zoster without complication    Personal history of COVID-19    Cervical cancer screening    Systemic lupus erythematosus ([facility] [place] [address] [facility]    Encounter for general adult medical examination with abnormal findings    Positive JUAN CARLOS (antinuclear antibody)    WHITEHEAD (dyspnea on exertion)    Left arm pain    Iron deficiency anemia       Past Medical History:   Diagnosis Date    Colitis     Gluten free diet        Past Surgical History:   Procedure Laterality Date     SECTION         Past OB/Gyn History:  OB History        2    Para   2    Term   2            AB        Living   2       SAB        TAB        Ectopic        Multiple        Live Births                      Pt does not have menstrual issues  History of sexually transmitted infection: No   History of abnormal pap smears: No      Patient is currently sexually active  heterosexual  The current method of family planning is none  History reviewed  No pertinent family history      Social History:  Social History     Socioeconomic History    Marital status: Single     Spouse name: Not on file    Number of children: Not on file    Years of education: Not on file    Highest education level: Not on file   Occupational History    Not on file   Social Needs    Financial resource strain: Not on file    Food insecurity     Worry: Not on file     Inability: Not on file    Transportation needs     Medical: Not on file     Non-medical: Not on file   Tobacco Use    Smoking status: Never Smoker    Smokeless tobacco: Never Used   Substance and Sexual Activity    Alcohol use: No    Drug use: No    Sexual activity: Yes     Partners: Male     Birth control/protection: None   Lifestyle    Physical activity     Days per week: Not on file     Minutes per session: Not on file    Stress: Not on file   Relationships    Social connections     Talks on phone: Not on file     Gets together: Not on file     Attends Oriental orthodox service: Not on file     Active member of club or organization: Not on file     Attends meetings of clubs or organizations: Not on file     Relationship status: Not on file    Intimate partner violence     Fear of current or ex partner: Not on file     Emotionally abused: Not on file     Physically abused: Not on file     Forced sexual activity: Not on file   Other Topics Concern    Not on file   Social History Narrative    Not on file       No Known Allergies    Current Outpatient Medications:     ferrous sulfate 324 (65 Fe) mg, Take 1 tablet (324 mg total) by mouth 2 (two) times a day before meals, Disp: 60 tablet, Rfl: 5    Review of Systems:    Review of Systems  Constitutional :no fever, feels well, no tiredness, no recent weight gain or loss  ENT: no ear ache, no loss of hearing, no nosebleeds or nasal discharge, no sore throat or hoarseness  Cardiovascular: no complaints of slow or fast heart beat, no chest pain, no palpitations, no leg claudication or lower extremity edema    Respiratory: no complaints of shortness of shortness of breath, no WHITEHEAD  Breasts:no complaints of breast pain, breast lump, or nipple discharge  Gastrointestinal: no complaints of abdominal pain, constipation, nausea, vomiting, or diarrhea or bloody stools  Genitourinary : no complaints of dysuria, incontinence, pelvic pain, no dysmenorrhea, vaginal discharge or abnormal vaginal bleeding and as noted in HPI  Musculoskeletal: no complaints of arthralgia, no myalgia, no joint swelling or stiffness, no limb pain or swelling  Integumentary: no complaints of skin rash or lesion, itching or dry skin  Neurological: no complaints of headache, no confusion, no numbness or tingling, no dizziness or fainting    Objective      /80   Wt 56 7 kg (125 lb)   LMP 05/12/2021   BMI 23 62 kg/m²     General:   appears stated age, cooperative, alert normal mood and affect   Lungs: Unlabored breathing    Breasts: normal appearance, no masses or tenderness   Abdomen: soft, non-tender, without masses or organomegaly   Vulva: normal   Vagina: normal vagina, no discharge, exudate, lesion, or erythema   Urethra: normal   Cervix: Normal, no discharge  Nontender     Uterus: normal size, contour, position, consistency, mobility, non-tender   Adnexa: no mass, fullness, tenderness   Psychiatric orientation to person, place, and time: normal  mood and affect: normal

## 2021-06-09 ENCOUNTER — LAB (OUTPATIENT)
Dept: LAB | Facility: HOSPITAL | Age: 51
End: 2021-06-09
Payer: COMMERCIAL

## 2021-06-09 DIAGNOSIS — D50.9 IRON DEFICIENCY ANEMIA, UNSPECIFIED IRON DEFICIENCY ANEMIA TYPE: ICD-10-CM

## 2021-06-09 LAB
HEMOCCULT STL QL IA: NEGATIVE
HPV HR 12 DNA CVX QL NAA+PROBE: NEGATIVE
HPV16 DNA CVX QL NAA+PROBE: NEGATIVE
HPV18 DNA CVX QL NAA+PROBE: NEGATIVE

## 2021-06-09 PROCEDURE — G0328 FECAL BLOOD SCRN IMMUNOASSAY: HCPCS

## 2021-06-10 LAB
LAB AP GYN PRIMARY INTERPRETATION: NORMAL
Lab: NORMAL

## 2021-06-23 LAB — MISCELLANEOUS LAB TEST RESULT: NORMAL

## 2021-06-30 ENCOUNTER — HOSPITAL ENCOUNTER (OUTPATIENT)
Dept: MAMMOGRAPHY | Facility: CLINIC | Age: 51
Discharge: HOME | End: 2021-06-30
Payer: COMMERCIAL

## 2021-06-30 VITALS — HEIGHT: 61 IN | WEIGHT: 125 LBS | BODY MASS INDEX: 23.6 KG/M2

## 2021-06-30 DIAGNOSIS — Z12.31 ENCOUNTER FOR SCREENING MAMMOGRAM FOR MALIGNANT NEOPLASM OF BREAST: ICD-10-CM

## 2021-06-30 PROCEDURE — 77063 BREAST TOMOSYNTHESIS BI: CPT

## 2021-06-30 PROCEDURE — 77067 SCR MAMMO BI INCL CAD: CPT

## 2021-08-23 ENCOUNTER — TELEPHONE (OUTPATIENT)
Dept: RHEUMATOLOGY | Facility: CLINIC | Age: 51
End: 2021-08-23

## 2021-08-23 ENCOUNTER — APPOINTMENT (OUTPATIENT)
Dept: LAB | Facility: MEDICAL CENTER | Age: 51
End: 2021-08-23
Payer: COMMERCIAL

## 2021-08-23 ENCOUNTER — CONSULT (OUTPATIENT)
Dept: RHEUMATOLOGY | Facility: CLINIC | Age: 51
End: 2021-08-23
Payer: COMMERCIAL

## 2021-08-23 VITALS
HEIGHT: 61 IN | BODY MASS INDEX: 23.98 KG/M2 | SYSTOLIC BLOOD PRESSURE: 120 MMHG | WEIGHT: 127 LBS | DIASTOLIC BLOOD PRESSURE: 80 MMHG

## 2021-08-23 DIAGNOSIS — M79.10 MYALGIA: ICD-10-CM

## 2021-08-23 DIAGNOSIS — R76.8 POSITIVE ANA (ANTINUCLEAR ANTIBODY): Primary | ICD-10-CM

## 2021-08-23 DIAGNOSIS — G56.02 CARPAL TUNNEL SYNDROME OF LEFT WRIST: ICD-10-CM

## 2021-08-23 LAB
25(OH)D3 SERPL-MCNC: 13 NG/ML (ref 30–100)
ALBUMIN SERPL BCP-MCNC: 3.6 G/DL (ref 3.5–5)
ALP SERPL-CCNC: 82 U/L (ref 46–116)
ALT SERPL W P-5'-P-CCNC: 28 U/L (ref 12–78)
ANION GAP SERPL CALCULATED.3IONS-SCNC: 2 MMOL/L (ref 4–13)
AST SERPL W P-5'-P-CCNC: 23 U/L (ref 5–45)
BACTERIA UR QL AUTO: ABNORMAL /HPF
BASOPHILS # BLD AUTO: 0.07 THOUSANDS/ΜL (ref 0–0.1)
BASOPHILS NFR BLD AUTO: 1 % (ref 0–1)
BILIRUB SERPL-MCNC: 0.22 MG/DL (ref 0.2–1)
BILIRUB UR QL STRIP: NEGATIVE
BUN SERPL-MCNC: 10 MG/DL (ref 5–25)
C3 SERPL-MCNC: 94.9 MG/DL (ref 90–180)
C4 SERPL-MCNC: 13 MG/DL (ref 10–40)
CALCIUM SERPL-MCNC: 8.7 MG/DL (ref 8.3–10.1)
CAOX CRY URNS QL MICRO: ABNORMAL /HPF
CHLORIDE SERPL-SCNC: 109 MMOL/L (ref 100–108)
CLARITY UR: ABNORMAL
CO2 SERPL-SCNC: 26 MMOL/L (ref 21–32)
COLOR UR: YELLOW
CREAT SERPL-MCNC: 0.42 MG/DL (ref 0.6–1.3)
CREAT UR-MCNC: 137 MG/DL
CRP SERPL QL: <3 MG/L
EOSINOPHIL # BLD AUTO: 0.18 THOUSAND/ΜL (ref 0–0.61)
EOSINOPHIL NFR BLD AUTO: 4 % (ref 0–6)
ERYTHROCYTE [DISTWIDTH] IN BLOOD BY AUTOMATED COUNT: 18 % (ref 11.6–15.1)
ERYTHROCYTE [SEDIMENTATION RATE] IN BLOOD: 28 MM/HOUR (ref 0–19)
GFR SERPL CREATININE-BSD FRML MDRD: 122 ML/MIN/1.73SQ M
GLUCOSE SERPL-MCNC: 93 MG/DL (ref 65–140)
GLUCOSE UR STRIP-MCNC: NEGATIVE MG/DL
HCT VFR BLD AUTO: 40.8 % (ref 34.8–46.1)
HGB BLD-MCNC: 12.2 G/DL (ref 11.5–15.4)
HGB UR QL STRIP.AUTO: NEGATIVE
IMM GRANULOCYTES # BLD AUTO: 0.01 THOUSAND/UL (ref 0–0.2)
IMM GRANULOCYTES NFR BLD AUTO: 0 % (ref 0–2)
KETONES UR STRIP-MCNC: NEGATIVE MG/DL
LEUKOCYTE ESTERASE UR QL STRIP: NEGATIVE
LYMPHOCYTES # BLD AUTO: 1.49 THOUSANDS/ΜL (ref 0.6–4.47)
LYMPHOCYTES NFR BLD AUTO: 30 % (ref 14–44)
MCH RBC QN AUTO: 24.9 PG (ref 26.8–34.3)
MCHC RBC AUTO-ENTMCNC: 29.9 G/DL (ref 31.4–37.4)
MCV RBC AUTO: 83 FL (ref 82–98)
MONOCYTES # BLD AUTO: 0.39 THOUSAND/ΜL (ref 0.17–1.22)
MONOCYTES NFR BLD AUTO: 8 % (ref 4–12)
NEUTROPHILS # BLD AUTO: 2.88 THOUSANDS/ΜL (ref 1.85–7.62)
NEUTS SEG NFR BLD AUTO: 57 % (ref 43–75)
NITRITE UR QL STRIP: NEGATIVE
NON-SQ EPI CELLS URNS QL MICRO: ABNORMAL /HPF
NRBC BLD AUTO-RTO: 0 /100 WBCS
PH UR STRIP.AUTO: 5.5 [PH]
PLATELET # BLD AUTO: 245 THOUSANDS/UL (ref 149–390)
POTASSIUM SERPL-SCNC: 4.1 MMOL/L (ref 3.5–5.3)
PROT SERPL-MCNC: 7.9 G/DL (ref 6.4–8.2)
PROT UR STRIP-MCNC: NEGATIVE MG/DL
PROT UR-MCNC: <6 MG/DL
PROT/CREAT UR: <0.04 MG/G{CREAT} (ref 0–0.1)
RBC # BLD AUTO: 4.89 MILLION/UL (ref 3.81–5.12)
RBC #/AREA URNS AUTO: ABNORMAL /HPF
SODIUM SERPL-SCNC: 137 MMOL/L (ref 136–145)
SP GR UR STRIP.AUTO: 1.03 (ref 1–1.03)
UROBILINOGEN UR QL STRIP.AUTO: 0.2 E.U./DL
WBC # BLD AUTO: 5.02 THOUSAND/UL (ref 4.31–10.16)
WBC #/AREA URNS AUTO: ABNORMAL /HPF

## 2021-08-23 PROCEDURE — 99244 OFF/OP CNSLTJ NEW/EST MOD 40: CPT | Performed by: INTERNAL MEDICINE

## 2021-08-23 PROCEDURE — 84156 ASSAY OF PROTEIN URINE: CPT | Performed by: INTERNAL MEDICINE

## 2021-08-23 PROCEDURE — 82570 ASSAY OF URINE CREATININE: CPT | Performed by: INTERNAL MEDICINE

## 2021-08-23 PROCEDURE — 36415 COLL VENOUS BLD VENIPUNCTURE: CPT | Performed by: INTERNAL MEDICINE

## 2021-08-23 PROCEDURE — 80053 COMPREHEN METABOLIC PANEL: CPT | Performed by: INTERNAL MEDICINE

## 2021-08-23 PROCEDURE — 86140 C-REACTIVE PROTEIN: CPT | Performed by: INTERNAL MEDICINE

## 2021-08-23 PROCEDURE — 86160 COMPLEMENT ANTIGEN: CPT | Performed by: INTERNAL MEDICINE

## 2021-08-23 PROCEDURE — 81001 URINALYSIS AUTO W/SCOPE: CPT | Performed by: INTERNAL MEDICINE

## 2021-08-23 PROCEDURE — 85652 RBC SED RATE AUTOMATED: CPT | Performed by: INTERNAL MEDICINE

## 2021-08-23 PROCEDURE — 82306 VITAMIN D 25 HYDROXY: CPT | Performed by: INTERNAL MEDICINE

## 2021-08-23 PROCEDURE — 85025 COMPLETE CBC W/AUTO DIFF WBC: CPT | Performed by: INTERNAL MEDICINE

## 2021-08-23 PROCEDURE — 86225 DNA ANTIBODY NATIVE: CPT | Performed by: INTERNAL MEDICINE

## 2021-08-23 RX ORDER — GABAPENTIN 100 MG/1
100 CAPSULE ORAL 3 TIMES DAILY
Qty: 90 CAPSULE | Refills: 3 | Status: SHIPPED | OUTPATIENT
Start: 2021-08-23

## 2021-08-23 NOTE — PATIENT INSTRUCTIONS
Do labs  Schedule EMG  Start gabapentin at bedtime, can take up to 3 times a day as needed for left arm pain  Use wrist splint at bedtime over left hand    Return to clinic in 4 months    Síndrome de Sjögren   CUIDADO AMBULATORIO:   El síndrome de Sjögren es bandar enfermedad que hace que el sistema inmunitario ataque las células sanas en bills cuerpo  Abron Chinle y salivales son las áreas más comunes del ataque  El síndrome de Sjögren puede ocurrir solo  [place] se llama síndrome de Sjögren primario  El síndrome de Sjögren secundario significa que también tiene bandar enfermedad autoinmunitaria, roys lupus o artritis  Signos y síntomas frecuentes del síndrome de Sjögren:  · Sequedad, ardor o picazón en los ojos    · Visión borrosa o sensibilidad a la avelina    · W  [employer] en la boca o sensación de tener algodón en la boca    · Dificultad para tragar, hablar o notar los sabores o ganglios inflamados en la garganta    · Caries dentales o infecciones de la boca    · Dolor en los músculos o las articulaciones, entumecimiento o sensación de hormigueo en los brazos o las piernas    · Piel seca o bandar erupción en los brazos o las piernas    · Tos seca, sensación de cansancio todos los días o fiebre baja    · En las mujeres, sequedad vaginal    Comuníquese con bills médico si:  · Usted tiene nuevos síntomas o tamika síntomas empeoran  · Tamika síntomas no mejoran aún con tratamiento  · Usted tiene preguntas o inquietudes acerca de bills condición o cuidado  Para los problemas de sequedad en los ojos:  · Parpadee a menudo  Al parpadear, mantiene los ojos húmedos  Recuerde parpadear a menudo, especialmente cuando trabaja en un ordenador  · Proteja tamika ojos  Cúbrase los ojos cuando esté en un clima frío o ventoso  También puede [employer] un humidificador para ayudar a mantener el aire húmedo  Evite cualquier cosa que [employer] ojos, rosy el humo  · Use lágrimas artificiales, según sea necesario   Son gotas o gel que se puede poner en los ojos  Dmitriy Brem[place] artificiales [employer] ojos húmedos  El médico puede darle bandar receta o niurka vez le indique que use las lágrimas de venta sin Chamisal Michael  Puede que necesite usar lágrimas artificiales que no contienen conservantes  Estas podrían irritar sara ojos  · Use un ungüento por la noche  Un ungüento que es más espeso que las lágrimas también puede ayudar  Puede causarle visión borrosa por un tiempo  Niurka vez lo ayude usarlo solo antes de dormir  · Los tapones para conductos lagrimales pueden ayudar a mantener las [employer] ojos  Bills médico puede colocar los [employer]  Los tapones [employer] lágrimas se drenen de los ojos demasiado rápidamente  · Es posible que necesite gotas de esteroides o [place]  Los esteroides o la cirugía pueden ayudar si los síntomas son severos  Estos son generalmente necesarios solo si otros tratamientos no funcionan  Para los problemas de sequedad en la boca:  · Mantenga bills boca hidratada  Mastique goma de mascar o chupe caramelos para ayudar a producir saliva  Elija goma de mascar o caramelos sin azúcar  La sequedad en la boca aumenta el riesgo de caries  La goma de mascar o los caramelos con azúcar aumentan más clemente riesgo  Brennon sorbos de [employer]  Trate de mojarse la boca cada vez que brennon algo  No brennon sorbos a menudo  Puede remover el moco que se encuentra dentro de la boca si rishi sorbos de agua muy a menudo marky el día  El moco que recubre la boca ayuda a [place]  · Vaya al dentista con regularidad  Bills dentista puede revisar si tiene caries y limpiar sara dientes  Informe a bills médico que tiene síndrome de Sjögren  Vaya al [employer] veces al año y si tiene cualquier dolor u otros problemas en la boca  Bills dentista puede recetarle suplementos con flúor o un gel que se puede aplicar en los dientes por la noche   También es posible que le coloquen un revestimiento en los dientes para proteger el esmalte  · [place] sara dientes y use hilo dental  Use bandar crema dental que contenga flúor  Cepíllese los dientes, las encías y la lengua  Michelle esto después de cada comida y antes de [place]  Use el hilo dental después de cepillarse  · Aplique pomada para los labios si están resecos o rajados  Pregunte a bills médico qué tipo de pomada para los labios debe usar  · Enjuáguese la boca con agua todos los días  No use ningún enjuague bucal o colutorio que contenga alcohol  El alcohol puede causarle sequedad en la boca  Bills médico puede recetarle un enjuague bucal o gel si tiene dolor o inflamación en la boca  Qué más puede hacer para controlar el síndrome de Sjögren:  · Tenga cuidado con la voz  Puede tener ronquera si sara cuerdas vocales se hinchan o se irritan debido a la garganta seca o la tos  No se despeje la garganta antes de hablar  Kristin algo de agua o use goma de mascar o un caramelo para prevenir la ronquera  También se puede hacer un yumiko tipo h para despejar la garganta suavemente  · [employer] líquidos  Dependiendo de sara síntomas y [place], bills médico puede indicarle que aumente o disminuya la cantidad de líquido que usted rishi  Los líquidos evitan la deshidratación, viviana demasiado líquido puede aumentar la micción  El aumento de la micción puede resultar en sequedad  Limite o no consuma bebidas alcohólicas, según indicaciones  El alcohol puede aumentar la sequedad  · Ejercítese según indicaciones  El ejercicio puede aumentar bills energía  El ejercicio también puede ayudar a mantener la agilidad de las articulaciones si usted tiene bandar enfermedad autoinmunitaria rosy la artritis  Bills médico puede ayudarle a crear un plan de ejercicios  Pregunte con qué frecuencia puede hacer ejercicio y cuáles son los mejores ejercicios para usted  · Consuma alimentos saludables y variados   Los alimentos saludables incluyen frutas, verduras, productos lácteos bajos en grasa, jr [place], pescado, panes integrales y frijoles cocidos  Limite los alimentos altos en sodio (sal)  La sal puede conducir a la deshidratación y aumentar la sequedad  Acuda a sara consultas de control con bills médico según le indicaron  Anote sara preguntas para que se acuerde de hacerlas marky sara visitas  © Copyright Kairos 2021 Information is for End User's use only and may not be sold, redistributed or otherwise used for commercial purposes  All illustrations and images included in CareNotes® are the copyrighted property of A [employer]  or [address] [place] [address] es sólo para uso en educación  Bills intención no es darle un consejo médico sobre enfermedades o tratamientos  Colsulte con bills Benuel Mcguire farmacéutico antes de seguir cualquier régimen médico para saber si es seguro y efectivo para usted  Lupus eritematoso   LO QUE NECESITA SABER:   ¿Qué es el lupus? El lupus es bandar enfermedad inflamatoria autoinmune  [place] significa que el sistema inmunológico comienza a atacar al cuerpo en lugar de atacar a los gérmenes dañinos  También se conoce rosy lupus eritematoso sistémico  El lupus es bandar enfermedad de por jessica que afecta a todas las partes de bills cuerpo  El lupus tiene períodos activos y tranquilos  El lupus tiene periodos activos y de remisión  Los periodos [facility] se conocen Cayla Brittany y son cuando usted presenta síntomas  Un periodo de remisión podría durar meses o años, o usted podría no tener periodos de remisión en lo absoluto  ¿Qué aumenta mi riesgo de padecer lupus? La causa del lupus se desconoce  Usted tiene mayor riesgo si es fredi, si benny hormonas o si vive con un familiar que tiene lupus  Ciertos medicamentos, rosy la hidralazina y la minociclina, pueden aumentar el riesgo de lupus  Consulte con bills médico para obtener más información sobre qué aumenta el riesgo del lupus  ¿Cuáles son los signos y síntomas del lupus?   · Fiebre por encima de los 100°F (38°C)    · Cansancio, pérdida de peso y dolor de [place]    · [employer] en forma de flynn de olivia    · Sensibilidad a la avelina solar    · Pérdida de ricki    · Ulceras en la boca o en la Erick Harrier    · Dolor en las articulaciones    · Dolor en el pecho o tos cuando respira profundo    · Dolor abdominal, náuseas o vómito    ¿Cómo se diagnostica el lupus? · Análisis de gayle: Se examinará la gayle en busca de infección, inflamación o anemia (escasez de glóbulos rojos)  · Análisis de orina: Se examinará la orina en busca de proteína o gayle    · Radiografía: Esta es bandar imagen de las articulaciones o del pecho para revisar si hay infección o líquido adicional     · Biopsia: Se podría extirpar tejido de la piel, músculo o riñón para buscar la causa del lupus  ¿Cómo se trata el lupus? No existe jadiel para el lupus  El lupus podría desencadenarse por el estrés, la avelina [place] o por bandar infección, rosy un resfriado  También puede deencadenarse por el humo del cigarro o los alimentos que consume  Lo siguiente le ayudará a controlar los síntomas:  · Medicamento contra la malaria: Norma se utiliza para aliviar los síntomas del lupus en las articulaciones y la piel, rosy salpullido y dolor en las articulaciones  · Esteroides: Estos disminuyen la inflamación  Podrían administrarse en forma de píldora, IV o pomada  · AINEs (analgésicos antiinflamatorios no esteroides): Estos disminuyen la inflamación, el dolor y la [place]  Los AINEs se pueden obtener sin receta médica  Consulte con bills médico cuál medicamento es el adecuado para usted  Pregunte cuánto hugo y cuándo  Tómelos rosy se le indique  Cuando no se marge de la [employer], los medicamentos antiinflamatorios no esteroides pueden causar sangrado estomacal y problemas renales  · Medicamentos inmunosupresores: Estos se utilizan para retardar el sistema Shellye Earnest a que el sistema inmunológico no ataque el cuerpo      · Medicamentos citotóxicos: Estos se utilizan para disminuir la inflamación en los músculos o articulaciones  También retarda al sistema inmunológico     ¿Cuáles son los riesgos del lupus? · Usted podría sentirse tan cansado que no podrá trabajar algunas veces  Existe mayor riesgo de bandar infección grave  Usted podría presentar pérdida de la visión  Podría deprimirse o sentir ansiedad  Los dedos de las elisa podrían verse pálidos o azules cuando estén fríos  A esto se le conoce rosy síndrome de [place]  Es posible que usted se vuelva olvidadizo o tenga dificultad para concentrarse  Usted podría presentar ayana de [place], problemas de visión o tener bandar convulsión  · Usted podría presentar enfermedad o insuficiencia renal  Podría tener presión arterial rafael o estrechamiento de las arterias  [place] puede conllevar a bandar enfermedad o insuficiencia cardíaca  Usted podría tener problemas de [place], rosy anemia  Se le podría formar un coágulo sanguíneo en la pierna  El coágulo podría desprenderse y viajar a bills corazón o cerebro y crear problemas de peligro mortal, rosy un ataque al corazón o un derrame cerebral     ¿Cómo puedo controlar los síntomas? · Descanse: Descanse cuando usted sienta que es necesario  Empiece a hacer un poco más día a día  Regrese a sara actividades diarias rosy se le haya indicado  · Proteja la piel de los bola UV: La avelina solar puede hacer que los síntomas del lupus empeoren  Evite el sol entre las 10 am y las 4 pm, que es cuando los bola son mas talita  Aplique protector solar con FPS de 30 o mas cada 2 horas cuando esté al [employer]  Michelle esto aún cuando esté nublado  Use pantalón y [place] larga para cubrir el cuerpo  Un sombrero con ala ancha puede proteger la awilda, cristal y arnav  · Calor: El calor ayuda a disminuir el dolor en las articulaciones o la inflamación  Aplique calor en la articulación adolorida por 20 a 30 minutos cada 2 horas por los días que se le indique      · Hielo: El hielo ayuda a disminuir la inflamación y el dolor  El hielo también puede contribuir a evitar el daño de los tejidos  Use bandar compresa de hielo o ponga hielo triturado en bandar bolsa de plástico  Eze Onemo con bandar toalla y colóquelo en el área adolorida por 15 a 20 minutos cada hora, o rosy se le indique  · Evite estar con personas enfermas: Usted tiene un riesgo mayor de contraer bandar infección grave  · Trate las erupciones rápidamente: Obdulia Deem a evitar enfermedades graves  ¿Cómo puedo evitar un brote de lupus? · Consuma alimentos saludables: Los alimentos saludables incluyen frutas, verduras, pan integral, productos lácteos bajos en grasa, frijoles, jr magras y pescado  Pregunte si necesita seguir bandar dieta especial     · Ejercicio: [place] ayudará a disminuir los síntomas y a evitar la depresión  Pregunte a bills médico acerca del mejor plan de ejercicio para usted  · Mantenga un peso saludable: Consulte con bills médico cuánto debería pesar  Pida que le ayude a crear un plan para bajar de peso si usted tiene sobrepeso  · No fume: Si usted fuma, nunca es demasiado tarde para dejar de hacerlo  Solicite información sobre rosy dejar de fumar si [place]  · Controle bills estrés: El estrés podría hacer más lenta bills recuperación y conllevar a enfermedades  Aprenda formas de controlar el estrés, rosy la relajación, respiración profunda y [place]  Hable con alguien acerca de las cosas que le Ernul  ¿Dónde puedo obtener [de-identified] y [place] información? · [facility] of [address] [place] [address] [address] , [address] , [address] [facility]  Phone: "[employer]a [employer]  Phone: [phone]  Web Address: [facility]  org    · [employer] of Arthritis and Musculoskeletal and [address] , [address]  Phone: [phone]  Phone: [phone]  Web Address: [employer] com au  [employer] gov  ¿Cuándo ramandeep comunicarme con mi médico?  · Tiene un brote de síntomas de lupus  · Tiene fiebre o dolor de [place]  · Siente que está comenzando a enfermarse  · Comienza a orinar menos de lo habitual     · Usted está sangrando por la nariz o las encías  · Usted se hace moretones fácilmente  · Usted tiene preguntas o inquietudes acerca de arroyo condición o cuidado  ¿Cuándo ramandeep buscar atención inmediata o llamar al 911? · Usted tiene gayle en arroyo orina, en sara evacuaciones intestinales o en arroyo vómito  · Usted tiene dolor abdominal intenso  · Usted está confundido, se siente mareado o siente que se va a desmayar  · Tiene entumecimiento o debilidad en la awilda o en las extremidades, o tiene dificultad para henna o hablar  · Usted sufre bandar convulsión  · Tiene cambios en la visión nuevos y repentinos  · Usted tiene dificultad para respirar  · Tiene dolor en el pecho, presión o [place] que se extiende a los [place], Jacqui o [place]  · Arroyo pierna se siente cálida, sensible y [place]  Se podría henna inflamado y morris  · Si de repente siente un desvanecimiento y falta de aire  · Si le duele el pecho cuando respira hondo o tose  · Usted expectora gayle  ACUERDOS SOBRE ARROYO CUIDADO:   Usted tiene el derecho de ayudar a planear arroyo cuidado  Aprenda todo lo que pueda sobre arroyo condición y rosy darle tratamiento  Discuta sara opciones de tratamiento con sara médicos para decidir el cuidado que usted desea recibir  Usted siempre tiene el derecho de rechazar el tratamiento  Esta información es sólo para uso en educación  Arroyo intención no es darle un consejo médico sobre enfermedades o tratamientos  Colsulte con arroyo Dorna Charbel farmacéutico antes de seguir cualquier régimen médico para saber si es seguro y efectivo para usted  © Copyright [facility] 2021 Information is for End User's use only and may not be sold, redistributed or otherwise used for commercial purposes   All illustrations and images included in Bernie are the copyrighted property of A D A M , Inc  or [facility] Arturo García

## 2021-08-23 NOTE — LETTER
September 14, 2021     Altagracia Cantu, [address] [facility]  [address]  [address]  [id number]    Patient: Sophie Grubbs   YOB: 1970   Date of Visit: 8/23/2021       Dear Dr Baldev Jennings: Thank you for referring Sophie Grubbs to me for evaluation  Below are my notes for this consultation  If you have questions, please do not hesitate to call me  I look forward to following your patient along with you  Sincerely,        Kari Francisco MD        CC: No Recipients  Kari Francisco MD  9/13/2021  2:49 AM  Signed  Assessment and Plan:   Do labs  Schedule EMG  Start gabapentin at bedtime, can take up to 3 times a day as needed for left arm pain  Use wrist splint at bedtime over left hand    Return to clinic in 4 months    Plan:  Diagnoses and all orders for this visit:    Positive JUAN CARLOS (antinuclear antibody)  -     Ambulatory referral to Rheumatology  -     CBC and differential  -     Comprehensive metabolic panel  -     C-reactive protein  -     Sedimentation rate, automated  -     Urinalysis with microscopic  -     Protein / creatinine ratio, urine  -     C4 complement  -     C3 complement  -     Anti-DNA antibody, double-stranded    Myalgia  -     Vitamin D 25 hydroxy    Carpal tunnel syndrome of left wrist  -     EMG 1 Limb; Future  -     gabapentin (NEURONTIN) 100 mg capsule; Take 1 capsule (100 mg total) by mouth 3 (three) times a day        Follow-up plan: RTC in 4 months        Chief Complaint  No chief complaint on file  HPI  Sophie Grubbs is a 52 y o   female who presents as a Rheumatology consult referred by Matilde Castellanos, [address] [place] [address] for evaluation of possible lupus  Left arm has been bothering her from left elbow down, it has been tingling/numb; comes and goes for the past several yars  When she was in [place] more than 10 years ago, was diagnosed with lupus, was on Plaquenil, didn't help much and felt it caused vision problems   She took it for 2 years; didn't help much, admits to photosensitivity, sometimes gets malar rash, and left knee bothers her  Hard to get up from seated position with left knee    No oral/nasal ulcer, no dry eyes, no dry mouth, no alopecia, or h/o blood clots or miscarriages  Review of Systems  Review of Systems   Constitutional: Positive for chills and fatigue  Negative for fever and unexpected weight change  HENT: Negative for mouth sores and trouble swallowing  Eyes: Negative for pain and visual disturbance  Respiratory: Negative for cough and shortness of breath  Cardiovascular: Negative for chest pain and leg swelling  Gastrointestinal: Negative for abdominal pain, blood in stool, constipation, diarrhea and nausea  Endocrine: Positive for cold intolerance  Tired/sluggish   Musculoskeletal: Positive for arthralgias  Negative for back pain, joint swelling and myalgias  Skin: Positive for rash  Negative for color change  Neurological: Positive for weakness, numbness and headaches  Hematological: Negative for adenopathy  Anemia   Psychiatric/Behavioral: Negative for sleep disturbance  Reviewed and agree      Allergies  No Known Allergies    Home Medications    Current Outpatient Medications:     ferrous sulfate 324 (65 Fe) mg, Take 1 tablet (324 mg total) by mouth 2 (two) times a day before meals, Disp: 60 tablet, Rfl: 5    calcium citrate-vitamin D (CITRACAL+D) 315-200 MG-UNIT per tablet, Take 1 tablet by mouth 2 (two) times a day, Disp: 60 tablet, Rfl: 5    Cholecalciferol (Vitamin D3) 50 MCG (2000 UT) capsule, Take 1 capsule (2,000 Units total) by mouth daily, Disp: 30 capsule, Rfl: 5    gabapentin (NEURONTIN) 100 mg capsule, Take 1 capsule (100 mg total) by mouth 3 (three) times a day, Disp: 90 capsule, Rfl: 3    Past Medical History  Past Medical History:   Diagnosis Date    Celiac disease     Colitis     Gluten free diet        Past Surgical History   Past Surgical History:   Procedure Laterality Date     SECTION         Family History    Family History   Problem Relation Age of Onset    No Known Problems Mother     No Known Problems Father     No Known Problems Sister     No Known Problems Daughter     No Known Problems Maternal Grandmother     No Known Problems Maternal Grandfather     No Known Problems Paternal Grandmother     No Known Problems Paternal Grandfather     Ovarian cancer Maternal Aunt     Breast cancer Maternal Aunt    sister - lupus  Aunt - lupus      Social History  Occupation: is a manager for [employer]  Social History     Substance and Sexual Activity   Alcohol Use Yes    Comment: once in a while/occasional     Social History     Substance and Sexual Activity   Drug Use No     Social History     Tobacco Use   Smoking Status Former Smoker   Smokeless Tobacco Never Used   Tobacco Comment    quit 20-30 yrs ago       Objective:  Vitals:    08/23/21 1254   BP: 120/80   BP Location: Right arm   Patient Position: Sitting   Cuff Size: Standard   Weight: 57 6 kg (127 lb)   Height: 5' 1" (1 549 m)       Physical Exam  Constitutional:       General: She is not in acute distress  Appearance: She is well-developed  HENT:      Head: Normocephalic and atraumatic  Eyes:      General: Lids are normal  No scleral icterus  Conjunctiva/sclera: Conjunctivae normal    Neck:      Thyroid: No thyromegaly  Cardiovascular:      Rate and Rhythm: Normal rate and regular rhythm  Heart sounds: S1 normal and S2 normal  No murmur heard  No friction rub  Pulmonary:      Effort: Pulmonary effort is normal  No tachypnea or respiratory distress  Breath sounds: Normal breath sounds  No wheezing, rhonchi or rales  Musculoskeletal:      Cervical back: Neck supple  No muscular tenderness  Lymphadenopathy:      Head:      Right side of head: No submental or submandibular adenopathy  Left side of head: No submental or submandibular adenopathy  Cervical: No cervical adenopathy  Skin:     General: Skin is warm and dry  Findings: No rash  Nails: There is no clubbing  Neurological:      Mental Status: She is alert  Sensory: No sensory deficit  Psychiatric:         Behavior: Behavior normal  Behavior is cooperative  Musculoskeletal--Peripheral Joint Exam:  Physical Exam  There is currently no information documented on the homunculus  Go to the Rheumatology activity and complete the homunculus joint exam   Joint Exam 08/23/2021     No joint exam has been documented for this visit     PETTY-28 (ESR): --  PETTY-28 (CRP): --      Reviewed labs and imaging      Imaging:   CXR 4/19/21 - unremarkable    Labs:   Consult on 08/23/2021   Component Date Value Ref Range Status    WBC 08/23/2021 5 02  4 31 - 10 16 Thousand/uL Final    RBC 08/23/2021 4 89  3 81 - 5 12 Million/uL Final    Hemoglobin 08/23/2021 12 2  11 5 - 15 4 g/dL Final    Hematocrit 08/23/2021 40 8  34 8 - 46 1 % Final    MCV 08/23/2021 83  82 - 98 fL Final    MCH 08/23/2021 24 9* 26 8 - 34 3 pg Final    MCHC 08/23/2021 29 9* 31 4 - 37 4 g/dL Final    RDW 08/23/2021 18 0* 11 6 - 15 1 % Final    Platelets 59/14/4385 245  149 - 390 Thousands/uL Final    nRBC 08/23/2021 0  /100 WBCs Final    Neutrophils Relative 08/23/2021 57  43 - 75 % Final    Immat GRANS % 08/23/2021 0  0 - 2 % Final    Lymphocytes Relative 08/23/2021 30  14 - 44 % Final    Monocytes Relative 08/23/2021 8  4 - 12 % Final    Eosinophils Relative 08/23/2021 4  0 - 6 % Final    Basophils Relative 08/23/2021 1  0 - 1 % Final    Neutrophils Absolute 08/23/2021 2 88  1 85 - 7 62 Thousands/µL Final    Immature Grans Absolute 08/23/2021 0 01  0 00 - 0 20 Thousand/uL Final    Lymphocytes Absolute 08/23/2021 1 49  0 60 - 4 47 Thousands/µL Final    Monocytes Absolute 08/23/2021 0 39  0 17 - 1 22 Thousand/µL Final    Eosinophils Absolute 08/23/2021 0 18  0 00 - 0 61 Thousand/µL Final    Basophils Absolute 08/23/2021 0 07  0 00 - 0 10 Thousands/µL Final    Sodium 08/23/2021 137  136 - 145 mmol/L Final    Potassium 08/23/2021 4 1  3 5 - 5 3 mmol/L Final    Chloride 08/23/2021 109* 100 - 108 mmol/L Final    CO2 08/23/2021 26  21 - 32 mmol/L Final    ANION GAP 08/23/2021 2* 4 - 13 mmol/L Final    BUN 08/23/2021 10  5 - 25 mg/dL Final    Creatinine 08/23/2021 0 42* 0 60 - 1 30 mg/dL Final    Standardized to IDMS reference method    Glucose 08/23/2021 93  65 - 140 mg/dL Final    If the patient is fasting, the ADA then defines impaired fasting glucose as > 100 mg/dL and diabetes as > or equal to 123 mg/dL  Specimen collection should occur prior to Sulfasalazine administration due to the potential for falsely depressed results  Specimen collection should occur prior to Sulfapyridine administration due to the potential for falsely elevated results   Calcium 08/23/2021 8 7  8 3 - 10 1 mg/dL Final    AST 08/23/2021 23  5 - 45 U/L Final    Specimen collection should occur prior to Sulfasalazine administration due to the potential for falsely depressed results   ALT 08/23/2021 28  12 - 78 U/L Final    Specimen collection should occur prior to Sulfasalazine and/or Sulfapyridine administration due to the potential for falsely depressed results   Alkaline Phosphatase 08/23/2021 82  46 - 116 U/L Final    Total Protein 08/23/2021 7 9  6 4 - 8 2 g/dL Final    Albumin 08/23/2021 3 6  3 5 - 5 0 g/dL Final    Total Bilirubin 08/23/2021 0 22  0 20 - 1 00 mg/dL Final    Use of this assay is not recommended for patients undergoing treatment with eltrombopag due to the potential for falsely elevated results      eGFR 08/23/2021 122  ml/min/1 73sq m Final    CRP 08/23/2021 <3 0  <3 0 mg/L Final    Sed Rate 08/23/2021 28* 0 - 19 mm/hour Final    Clarity, UA 08/23/2021 Cloudy   Final    Color, UA 08/23/2021 Yellow   Final    Specific Gravity, UA 08/23/2021 1 027  1 003 - 1 030 Final    pH, UA 08/23/2021 5 5  4 5, 5 0, 5 5, 6 0, 6 5, 7 0, 7 5, 8 0 Final    Glucose, UA 08/23/2021 Negative  Negative mg/dl Final    Ketones, UA 08/23/2021 Negative  Negative mg/dl Final    Blood, UA 08/23/2021 Negative  Negative Final    Protein, UA 08/23/2021 Negative  Negative mg/dl Final    Nitrite, UA 08/23/2021 Negative  Negative Final    Bilirubin, UA 08/23/2021 Negative  Negative Final    Urobilinogen, UA 08/23/2021 0 2  0 2, 1 0 E U /dl E U /dl Final    Leukocytes, UA 08/23/2021 Negative  Negative Final    WBC, UA 08/23/2021 None Seen  None Seen, 2-4, 5-60 /hpf Final    RBC, UA 08/23/2021 None Seen  None Seen, 2-4 /hpf Final    Bacteria, UA 08/23/2021 Occasional  None Seen, Occasional /hpf Final    Ca Oxalate Vane, UA 08/23/2021 Innumerable* None Seen /hpf Final    Epithelial Cells 08/23/2021 None Seen  None Seen, Occasional /hpf Final    Creatinine, Ur 08/23/2021 137 0  mg/dL Final    Protein Urine Random 08/23/2021 <6  mg/dL Final    Prot/Creat Ratio, Ur 08/23/2021 <0 04  0 00 - 0 10 Final    C4, COMPLEMENT 08/23/2021 13 0  10 0 - 40 0 mg/dL Final    C3 Complement 08/23/2021 94 9  90 0 - 180 0 mg/dL Final    ds DNA Ab 08/23/2021 8  0 - 9 IU/mL Final                                       Negative      <5                                     Equivocal  5 - 9                                     Positive      >9    Vit D, 25-Hydroxy 08/23/2021 13 0* 30 0 - 100 0 ng/mL Final   Lab on 06/09/2021   Component Date Value Ref Range Status    OCCULT BLD, FECAL IMMUNOLOGICAL 06/09/2021 Negative  Negative Final   Office Visit on 06/08/2021   Component Date Value Ref Range Status    Case Report 06/08/2021    Final                    Value:Gynecologic Cytology Report                       Case: [id number]                                  Authorizing Provider:  Loly Quesada MD  Collected:           06/08/2021 0921              Ordering Location:     [facility]  Received:            06/08/2021 5789                                     [facility] Moreno                                                           First Screen:          Simeon Acosta                                                                  Specimen:    LIQUID-BASED PAP, SCREENING, Endocervical                                                  Primary Interpretation 06/08/2021 Negative for intraepithelial lesion or malignancy   Final    Specimen Adequacy 06/08/2021 Satisfactory for evaluation  Endocervical/transformation zone component present  Final    Additional Information 06/08/2021    Final                    Value: This result contains rich text formatting which cannot be displayed here   HPV Other HR 06/08/2021 Negative  Negative Final    HPV types: 10,79,05,36,50,50,85,51,13,44,73 and 68 DNA are undetectable or below the pre-set threshold   HPV16 06/08/2021 Negative  Negative Final    HPV18 06/08/2021 Negative  Negative Final   Appointment on 05/24/2021   Component Date Value Ref Range Status    ds DNA Ab 05/24/2021 8  0 - 9 IU/mL Final                                       Negative      <5                                     Equivocal  5 - 9                                     Positive      >9    CRP 05/24/2021 <5 0  <10 0 mg/L Final   Appointment on 04/26/2021   Component Date Value Ref Range Status    Iron 04/26/2021 21* 50 - 170 ug/dL Final    Patients treated with metal-binding drugs (ie  Deferoxamine) may have depressed iron values      TIBC 04/26/2021 364  250 - 450 ug/dL Final    Ferritin 04/26/2021 5* 8 - 388 ng/mL Final   Appointment on 04/19/2021   Component Date Value Ref Range Status    WBC 04/19/2021 7 10  4 50 - 11 00 Thousand/uL Final    RBC 04/19/2021 4 66  4 00 - 5 20 Million/uL Final    Hemoglobin 04/19/2021 11 0* 12 0 - 16 0 g/dL Final    Hematocrit 04/19/2021 36 2  36 0 - 46 0 % Final    MCV 04/19/2021 78* 80 - 100 fL Final    MCH 04/19/2021 23 7* 26 0 - 34 0 pg Final    MCHC 04/19/2021 30 5* 31 0 - 36 0 g/dL Final    RDW 04/19/2021 18 0* <15 3 % Final    MPV 04/19/2021 12 5  8 9 - 12 7 fL Final    Platelets 60/34/8097 220  150 - 450 Thousands/uL Final    Neutrophils Relative 04/19/2021 72* 45 - 65 % Final    Lymphocytes Relative 04/19/2021 17* 25 - 45 % Final    Monocytes Relative 04/19/2021 7  1 - 10 % Final    Eosinophils Relative 04/19/2021 3  0 - 6 % Final    Basophils Relative 04/19/2021 1  0 - 1 % Final    Neutrophils Absolute 04/19/2021 5 10  1 80 - 7 80 Thousands/µL Final    Lymphocytes Absolute 04/19/2021 1 20  0 50 - 4 00 Thousands/µL Final    Monocytes Absolute 04/19/2021 0 50  0 20 - 0 90 Thousand/µL Final    Eosinophils Absolute 04/19/2021 0 20  0 00 - 0 40 Thousand/µL Final    Basophils Absolute 04/19/2021 0 10  0 00 - 0 10 Thousands/µL Final    Sodium 04/19/2021 137  137 - 147 mmol/L Final    Potassium 04/19/2021 3 8  3 6 - 5 0 mmol/L Final    Chloride 04/19/2021 105  97 - 108 mmol/L Final    CO2 04/19/2021 27  22 - 30 mmol/L Final    ANION GAP 04/19/2021 5  5 - 14 mmol/L Final    BUN 04/19/2021 6  5 - 25 mg/dL Final    Creatinine 04/19/2021 0 41* 0 60 - 1 20 mg/dL Final    Standardized to IDMS reference method    Glucose, Fasting 04/19/2021 92  70 - 99 mg/dL Final    Specimen collection should occur prior to Sulfasalazine administration due to the potential for falsely depressed results  Specimen collection should occur prior to Sulfapyridine administration due to the potential for falsely elevated results   Calcium 04/19/2021 8 8  8 4 - 10 2 mg/dL Final    AST 04/19/2021 23  14 - 36 U/L Final    Specimen collection should occur prior to Sulfasalazine administration due to the potential for falsely depressed results   ALT 04/19/2021 13  <35 U/L Final    Specimen collection should occur prior to Sulfasalazine administration due to the potential for falsely depressed results       Alkaline Phosphatase 04/19/2021 76  43 - 122 U/L Final    Total Protein 04/19/2021 7 5  5 9 - 8 4 g/dL Final  Albumin 04/19/2021 3 8  3 0 - 5 2 g/dL Final    Total Bilirubin 04/19/2021 0 24  <1 30 mg/dL Final    eGFR 04/19/2021 123  >60 ml/min/1 73sq m Final    Cholesterol 04/19/2021 178  <200 mg/dL Final    Cholesterol:       Desirable         <200 mg/dl       Borderline         200-239 mg/dl       High              >239           Triglycerides 04/19/2021 99  <150 mg/dL Final    Triglyceride:     Normal          <150 mg/dl     Borderline High 150-199 mg/dl     High            200-499 mg/dl        Very High       >499 mg/dl    Specimen collection should occur prior to N-Acetylcysteine or Metamizole administration due to the potential for falsely depressed results   HDL, Direct 04/19/2021 60  >=40 mg/dL Final    HDL Cholesterol:       Low     <41 mg/dL  Specimen collection should occur prior to Metamizole administration due to the potential for falsley depressed results   LDL Calculated 04/19/2021 98  <130 mg/dL Final    LDL Cholesterol:     Optimal           <100 mg/dl     Near Optimal      100-129 mg/dl     Above Optimal       Borderline High 130-159 mg/dl       High            160-189 mg/dl       Very High       >189 mg/dl         This screening LDL is a calculated result  It does not have the accuracy of the Direct Measured LDL in the monitoring of patients with hyperlipidemia and/or statin therapy  Direct Measure LDL (QBF439) must be ordered separately in these patients   Non-HDL-Chol (CHOL-HDL) 04/19/2021 118  mg/dl Final    TSH 3RD GENERATON 04/19/2021 2 500  0 465 - 4 680 uIU/mL Final    The recommended reference ranges for TSH during pregnancy are as follows:   First trimester 0 1 to 2 5 uIU/mL   Second trimester  0 2 to 3 0 uIU/mL   Third trimester 0 3 to 3 0 uIU/m    Note: Normal ranges may not apply to patients who are transgender, non-binary, or whose legal sex, sex at birth, and gender identity differ    Using supplements with high doses of biotin 20 to more than 300 times greater than the adequate daily intake for adults of 30 mcg/day as established by the [employer], can cause falsely depress results   Hemoglobin A1C 04/19/2021 5 6  Normal 3 8-5 6%; PreDiabetic 5 7-6 4%; Diabetic >=6 5%; Glycemic control for adults with diabetes <7 0% % Final    EAG 04/19/2021 114  mg/dl Final    HIV-1/HIV-2 Ab 04/19/2021 Non-Reactive  Non-Reactive Final    Miscellaneous Lab Test Result 04/19/2021 SEE SCANNED REPORT   Final    RBC Morphology 04/19/2021 abnormal   Final    Hypochromia 04/19/2021 Present   Final    Microcytes 04/19/2021 Present   Final    Platelet Estimate 31/66/2349 Adequate  Adequate Final    Large Platelet 30/53/8277 Present   Final   Office Visit on 04/12/2021   Component Date Value Ref Range Status    Lyme total antibody 04/19/2021 12  0 00 - 119 Final    Negative (0-119) Absence of detectable Borrelia burgdoferi Antibodies  A negative result does not exclude the possibility of Borrelia infection  If early Lyme disease is suspected,a second sample should be collected & tested 4 weeks after initial testing       JUAN CARLOS 04/19/2021 Positive* Negative Final    Rheumatoid Factor 04/19/2021 Negative  Negative Final    SS-A (RO) Ab 04/19/2021 4 4* 0 0 - 0 9 AI Final    SS-B (LA) Ab 04/19/2021 <0 2  0 0 - 0 9 AI Final    JUAN CARLOS Titer 1 04/19/2021 Titer of 160   Final    JUAN CARLOS Pattern 1 04/19/2021 Speckled pattern   Final

## 2021-08-23 NOTE — PROGRESS NOTES
Assessment and Plan:   Do labs  Schedule EMG  Start gabapentin at bedtime, can take up to 3 times a day as needed for left arm pain  Use wrist splint at bedtime over left hand    Return to clinic in 4 months    Plan:  Diagnoses and all orders for this visit:    Positive JUAN CARLOS (antinuclear antibody)  -     Ambulatory referral to Rheumatology  -     CBC and differential  -     Comprehensive metabolic panel  -     C-reactive protein  -     Sedimentation rate, automated  -     Urinalysis with microscopic  -     Protein / creatinine ratio, urine  -     C4 complement  -     C3 complement  -     Anti-DNA antibody, double-stranded    Myalgia  -     Vitamin D 25 hydroxy    Carpal tunnel syndrome of left wrist  -     EMG 1 Limb; Future  -     gabapentin (NEURONTIN) 100 mg capsule; Take 1 capsule (100 mg total) by mouth 3 (three) times a day        Follow-up plan: RTC in 4 months        Chief Complaint  No chief complaint on file  HPI  Arnaldo Song is a 52 y o   female who presents as a Rheumatology consult referred by Stefany Randle for evaluation of possible lupus  Left arm has been bothering her from left elbow down, it has been tingling/numb; comes and goes for the past several yars  When she was in [place] more than 10 years ago, was diagnosed with lupus, was on Plaquenil, didn't help much and felt it caused vision problems  She took it for 2 years; didn't help much, admits to photosensitivity, sometimes gets malar rash, and left knee bothers her  Hard to get up from seated position with left knee    No oral/nasal ulcer, no dry eyes, no dry mouth, no alopecia, or h/o blood clots or miscarriages  Review of Systems  Review of Systems   Constitutional: Positive for chills and fatigue  Negative for fever and unexpected weight change  HENT: Negative for mouth sores and trouble swallowing  Eyes: Negative for pain and visual disturbance  Respiratory: Negative for cough and shortness of breath  Cardiovascular: Negative for chest pain and leg swelling  Gastrointestinal: Negative for abdominal pain, blood in stool, constipation, diarrhea and nausea  Endocrine: Positive for cold intolerance  Tired/sluggish   Musculoskeletal: Positive for arthralgias  Negative for back pain, joint swelling and myalgias  Skin: Positive for rash  Negative for color change  Neurological: Positive for weakness, numbness and headaches  Hematological: Negative for adenopathy  Anemia   Psychiatric/Behavioral: Negative for sleep disturbance  Reviewed and agree      Allergies  No Known Allergies    Home Medications    Current Outpatient Medications:     ferrous sulfate 324 (65 Fe) mg, Take 1 tablet (324 mg total) by mouth 2 (two) times a day before meals, Disp: 60 tablet, Rfl: 5    calcium citrate-vitamin D (CITRACAL+D) 315-200 MG-UNIT per tablet, Take 1 tablet by mouth 2 (two) times a day, Disp: 60 tablet, Rfl: 5    Cholecalciferol (Vitamin D3) 50 MCG (2000 UT) capsule, Take 1 capsule (2,000 Units total) by mouth daily, Disp: 30 capsule, Rfl: 5    gabapentin (NEURONTIN) 100 mg capsule, Take 1 capsule (100 mg total) by mouth 3 (three) times a day, Disp: 90 capsule, Rfl: 3    Past Medical History  Past Medical History:   Diagnosis Date    Celiac disease     Colitis     Gluten free diet        Past Surgical History   Past Surgical History:   Procedure Laterality Date     SECTION         Family History    Family History   Problem Relation Age of Onset    No Known Problems Mother     No Known Problems Father     No Known Problems Sister     No Known Problems Daughter     No Known Problems Maternal Grandmother     No Known Problems Maternal Grandfather     No Known Problems Paternal Grandmother     No Known Problems Paternal Grandfather     Ovarian cancer Maternal Aunt     Breast cancer Maternal Aunt    sister - lupus  Aunt - lupus      Social History  Occupation: is a manager for airport [employer]  Social History     Substance and Sexual Activity   Alcohol Use Yes    Comment: once in a while/occasional     Social History     Substance and Sexual Activity   Drug Use No     Social History     Tobacco Use   Smoking Status Former Smoker   Smokeless Tobacco Never Used   Tobacco Comment    quit 20-30 yrs ago       Objective:  Vitals:    08/23/21 1254   BP: 120/80   BP Location: Right arm   Patient Position: Sitting   Cuff Size: Standard   Weight: 57 6 kg (127 lb)   Height: 5' 1" (1 549 m)       Physical Exam  Constitutional:       General: She is not in acute distress  Appearance: She is well-developed  HENT:      Head: Normocephalic and atraumatic  Eyes:      General: Lids are normal  No scleral icterus  Conjunctiva/sclera: Conjunctivae normal    Neck:      Thyroid: No thyromegaly  Cardiovascular:      Rate and Rhythm: Normal rate and regular rhythm  Heart sounds: S1 normal and S2 normal  No murmur heard  No friction rub  Pulmonary:      Effort: Pulmonary effort is normal  No tachypnea or respiratory distress  Breath sounds: Normal breath sounds  No wheezing, rhonchi or rales  Musculoskeletal:      Cervical back: Neck supple  No muscular tenderness  Lymphadenopathy:      Head:      Right side of head: No submental or submandibular adenopathy  Left side of head: No submental or submandibular adenopathy  Cervical: No cervical adenopathy  Skin:     General: Skin is warm and dry  Findings: No rash  Nails: There is no clubbing  Neurological:      Mental Status: She is alert  Sensory: No sensory deficit  Psychiatric:         Behavior: Behavior normal  Behavior is cooperative  Musculoskeletal--Peripheral Joint Exam:  Physical Exam  There is currently no information documented on the homunculus   Go to the Rheumatology activity and complete the homunculus joint exam   Joint Exam 08/23/2021     No joint exam has been documented for this visit     PETTY-28 (ESR): --  PETTY-28 (CRP): --      Reviewed labs and imaging      Imaging:   CXR 4/19/21 - unremarkable    Labs:   Consult on 08/23/2021   Component Date Value Ref Range Status    WBC 08/23/2021 5 02  4 31 - 10 16 Thousand/uL Final    RBC 08/23/2021 4 89  3 81 - 5 12 Million/uL Final    Hemoglobin 08/23/2021 12 2  11 5 - 15 4 g/dL Final    Hematocrit 08/23/2021 40 8  34 8 - 46 1 % Final    MCV 08/23/2021 83  82 - 98 fL Final    MCH 08/23/2021 24 9* 26 8 - 34 3 pg Final    MCHC 08/23/2021 29 9* 31 4 - 37 4 g/dL Final    RDW 08/23/2021 18 0* 11 6 - 15 1 % Final    Platelets 24/55/4635 245  149 - 390 Thousands/uL Final    nRBC 08/23/2021 0  /100 WBCs Final    Neutrophils Relative 08/23/2021 57  43 - 75 % Final    Immat GRANS % 08/23/2021 0  0 - 2 % Final    Lymphocytes Relative 08/23/2021 30  14 - 44 % Final    Monocytes Relative 08/23/2021 8  4 - 12 % Final    Eosinophils Relative 08/23/2021 4  0 - 6 % Final    Basophils Relative 08/23/2021 1  0 - 1 % Final    Neutrophils Absolute 08/23/2021 2 88  1 85 - 7 62 Thousands/µL Final    Immature Grans Absolute 08/23/2021 0 01  0 00 - 0 20 Thousand/uL Final    Lymphocytes Absolute 08/23/2021 1 49  0 60 - 4 47 Thousands/µL Final    Monocytes Absolute 08/23/2021 0 39  0 17 - 1 22 Thousand/µL Final    Eosinophils Absolute 08/23/2021 0 18  0 00 - 0 61 Thousand/µL Final    Basophils Absolute 08/23/2021 0 07  0 00 - 0 10 Thousands/µL Final    Sodium 08/23/2021 137  136 - 145 mmol/L Final    Potassium 08/23/2021 4 1  3 5 - 5 3 mmol/L Final    Chloride 08/23/2021 109* 100 - 108 mmol/L Final    CO2 08/23/2021 26  21 - 32 mmol/L Final    ANION GAP 08/23/2021 2* 4 - 13 mmol/L Final    BUN 08/23/2021 10  5 - 25 mg/dL Final    Creatinine 08/23/2021 0 42* 0 60 - 1 30 mg/dL Final    Standardized to IDMS reference method    Glucose 08/23/2021 93  65 - 140 mg/dL Final    If the patient is fasting, the ADA then defines impaired fasting glucose as > 100 mg/dL and diabetes as > or equal to 123 mg/dL  Specimen collection should occur prior to Sulfasalazine administration due to the potential for falsely depressed results  Specimen collection should occur prior to Sulfapyridine administration due to the potential for falsely elevated results   Calcium 08/23/2021 8 7  8 3 - 10 1 mg/dL Final    AST 08/23/2021 23  5 - 45 U/L Final    Specimen collection should occur prior to Sulfasalazine administration due to the potential for falsely depressed results   ALT 08/23/2021 28  12 - 78 U/L Final    Specimen collection should occur prior to Sulfasalazine and/or Sulfapyridine administration due to the potential for falsely depressed results   Alkaline Phosphatase 08/23/2021 82  46 - 116 U/L Final    Total Protein 08/23/2021 7 9  6 4 - 8 2 g/dL Final    Albumin 08/23/2021 3 6  3 5 - 5 0 g/dL Final    Total Bilirubin 08/23/2021 0 22  0 20 - 1 00 mg/dL Final    Use of this assay is not recommended for patients undergoing treatment with eltrombopag due to the potential for falsely elevated results      eGFR 08/23/2021 122  ml/min/1 73sq m Final    CRP 08/23/2021 <3 0  <3 0 mg/L Final    Sed Rate 08/23/2021 28* 0 - 19 mm/hour Final    Clarity, UA 08/23/2021 Cloudy   Final    Color, UA 08/23/2021 Yellow   Final    Specific Gravity, UA 08/23/2021 1 027  1 003 - 1 030 Final    pH, UA 08/23/2021 5 5  4 5, 5 0, 5 5, 6 0, 6 5, 7 0, 7 5, 8 0 Final    Glucose, UA 08/23/2021 Negative  Negative mg/dl Final    Ketones, UA 08/23/2021 Negative  Negative mg/dl Final    Blood, UA 08/23/2021 Negative  Negative Final    Protein, UA 08/23/2021 Negative  Negative mg/dl Final    Nitrite, UA 08/23/2021 Negative  Negative Final    Bilirubin, UA 08/23/2021 Negative  Negative Final    Urobilinogen, UA 08/23/2021 0 2  0 2, 1 0 E U /dl E U /dl Final    Leukocytes, UA 08/23/2021 Negative  Negative Final    WBC, UA 08/23/2021 None Seen  None Seen, 2-4, 5-60 /hpf Final    RBC, UA 08/23/2021 None Seen  None Seen, 2-4 /hpf Final    Bacteria, UA 08/23/2021 Occasional  None Seen, Occasional /hpf Final    Ca Oxalate Vane, UA 08/23/2021 Innumerable* None Seen /hpf Final    Epithelial Cells 08/23/2021 None Seen  None Seen, Occasional /hpf Final    Creatinine, Ur 08/23/2021 137 0  mg/dL Final    Protein Urine Random 08/23/2021 <6  mg/dL Final    Prot/Creat Ratio, Ur 08/23/2021 <0 04  0 00 - 0 10 Final    C4, COMPLEMENT 08/23/2021 13 0  10 0 - 40 0 mg/dL Final    C3 Complement 08/23/2021 94 9  90 0 - 180 0 mg/dL Final    ds DNA Ab 08/23/2021 8  0 - 9 IU/mL Final                                       Negative      <5                                     Equivocal  5 - 9                                     Positive      >9    Vit D, 25-Hydroxy 08/23/2021 13 0* 30 0 - 100 0 ng/mL Final   Lab on 06/09/2021   Component Date Value Ref Range Status    OCCULT BLD, FECAL IMMUNOLOGICAL 06/09/2021 Negative  Negative Final   Office Visit on 06/08/2021   Component Date Value Ref Range Status    Case Report 06/08/2021    Final                    Value:Gynecologic Cytology Report                       Case: [id number]                                  Authorizing Provider:  Nathan Denton MD  Collected:           06/08/2021 0921              Ordering Location:     [facility]  Received:            06/08/2021 0921                                     [facility]                                                           First Screen:          Lajean Litter                                                                  Specimen:    LIQUID-BASED PAP, SCREENING, Endocervical                                                  Primary Interpretation 06/08/2021 Negative for intraepithelial lesion or malignancy   Final    Specimen Adequacy 06/08/2021 Satisfactory for evaluation  Endocervical/transformation zone component present     Final    Additional Information 06/08/2021 Final                    Value: This result contains rich text formatting which cannot be displayed here   HPV Other HR 06/08/2021 Negative  Negative Final    HPV types: 38,07,05,30,97,14,22,56,99,57,49 and 68 DNA are undetectable or below the pre-set threshold   HPV16 06/08/2021 Negative  Negative Final    HPV18 06/08/2021 Negative  Negative Final   Appointment on 05/24/2021   Component Date Value Ref Range Status    ds DNA Ab 05/24/2021 8  0 - 9 IU/mL Final                                       Negative      <5                                     Equivocal  5 - 9                                     Positive      >9    CRP 05/24/2021 <5 0  <10 0 mg/L Final   Appointment on 04/26/2021   Component Date Value Ref Range Status    Iron 04/26/2021 21* 50 - 170 ug/dL Final    Patients treated with metal-binding drugs (ie  Deferoxamine) may have depressed iron values      TIBC 04/26/2021 364  250 - 450 ug/dL Final    Ferritin 04/26/2021 5* 8 - 388 ng/mL Final   Appointment on 04/19/2021   Component Date Value Ref Range Status    WBC 04/19/2021 7 10  4 50 - 11 00 Thousand/uL Final    RBC 04/19/2021 4 66  4 00 - 5 20 Million/uL Final    Hemoglobin 04/19/2021 11 0* 12 0 - 16 0 g/dL Final    Hematocrit 04/19/2021 36 2  36 0 - 46 0 % Final    MCV 04/19/2021 78* 80 - 100 fL Final    MCH 04/19/2021 23 7* 26 0 - 34 0 pg Final    MCHC 04/19/2021 30 5* 31 0 - 36 0 g/dL Final    RDW 04/19/2021 18 0* <15 3 % Final    MPV 04/19/2021 12 5  8 9 - 12 7 fL Final    Platelets 28/50/6784 220  150 - 450 Thousands/uL Final    Neutrophils Relative 04/19/2021 72* 45 - 65 % Final    Lymphocytes Relative 04/19/2021 17* 25 - 45 % Final    Monocytes Relative 04/19/2021 7  1 - 10 % Final    Eosinophils Relative 04/19/2021 3  0 - 6 % Final    Basophils Relative 04/19/2021 1  0 - 1 % Final    Neutrophils Absolute 04/19/2021 5 10  1 80 - 7 80 Thousands/µL Final    Lymphocytes Absolute 04/19/2021 1 20  0 50 - 4 00 Thousands/µL Final    Monocytes Absolute 04/19/2021 0 50  0 20 - 0 90 Thousand/µL Final    Eosinophils Absolute 04/19/2021 0 20  0 00 - 0 40 Thousand/µL Final    Basophils Absolute 04/19/2021 0 10  0 00 - 0 10 Thousands/µL Final    Sodium 04/19/2021 137  137 - 147 mmol/L Final    Potassium 04/19/2021 3 8  3 6 - 5 0 mmol/L Final    Chloride 04/19/2021 105  97 - 108 mmol/L Final    CO2 04/19/2021 27  22 - 30 mmol/L Final    ANION GAP 04/19/2021 5  5 - 14 mmol/L Final    BUN 04/19/2021 6  5 - 25 mg/dL Final    Creatinine 04/19/2021 0 41* 0 60 - 1 20 mg/dL Final    Standardized to IDMS reference method    Glucose, Fasting 04/19/2021 92  70 - 99 mg/dL Final    Specimen collection should occur prior to Sulfasalazine administration due to the potential for falsely depressed results  Specimen collection should occur prior to Sulfapyridine administration due to the potential for falsely elevated results   Calcium 04/19/2021 8 8  8 4 - 10 2 mg/dL Final    AST 04/19/2021 23  14 - 36 U/L Final    Specimen collection should occur prior to Sulfasalazine administration due to the potential for falsely depressed results   ALT 04/19/2021 13  <35 U/L Final    Specimen collection should occur prior to Sulfasalazine administration due to the potential for falsely depressed results       Alkaline Phosphatase 04/19/2021 76  43 - 122 U/L Final    Total Protein 04/19/2021 7 5  5 9 - 8 4 g/dL Final    Albumin 04/19/2021 3 8  3 0 - 5 2 g/dL Final    Total Bilirubin 04/19/2021 0 24  <1 30 mg/dL Final    eGFR 04/19/2021 123  >60 ml/min/1 73sq m Final    Cholesterol 04/19/2021 178  <200 mg/dL Final    Cholesterol:       Desirable         <200 mg/dl       Borderline         200-239 mg/dl       High              >239           Triglycerides 04/19/2021 99  <150 mg/dL Final    Triglyceride:     Normal          <150 mg/dl     Borderline High 150-199 mg/dl     High            200-499 mg/dl        Very High       >499 mg/dl    Specimen collection should occur prior to N-Acetylcysteine or Metamizole administration due to the potential for falsely depressed results   HDL, Direct 04/19/2021 60  >=40 mg/dL Final    HDL Cholesterol:       Low     <41 mg/dL  Specimen collection should occur prior to Metamizole administration due to the potential for falsley depressed results   LDL Calculated 04/19/2021 98  <130 mg/dL Final    LDL Cholesterol:     Optimal           <100 mg/dl     Near Optimal      100-129 mg/dl     Above Optimal       Borderline High 130-159 mg/dl       High            160-189 mg/dl       Very High       >189 mg/dl         This screening LDL is a calculated result  It does not have the accuracy of the Direct Measured LDL in the monitoring of patients with hyperlipidemia and/or statin therapy  Direct Measure LDL ([id number]) must be ordered separately in these patients   Non-HDL-Chol (CHOL-HDL) 04/19/2021 118  mg/dl Final    TSH 3RD GENERATON 04/19/2021 2 500  0 465 - 4 680 uIU/mL Final    The recommended reference ranges for TSH during pregnancy are as follows:   First trimester 0 1 to 2 5 uIU/mL   Second trimester  0 2 to 3 0 uIU/mL   Third trimester 0 3 to 3 0 uIU/m    Note: Normal ranges may not apply to patients who are transgender, non-binary, or whose legal sex, sex at birth, and gender identity differ  Using supplements with high doses of biotin 20 to more than 300 times greater than the adequate daily intake for adults of 30 mcg/day as established by the [place] [employer], can cause falsely depress results   Hemoglobin A1C 04/19/2021 5 6  Normal 3 8-5 6%; PreDiabetic 5 7-6 4%;  Diabetic >=6 5%; Glycemic control for adults with diabetes <7 0% % Final    EAG 04/19/2021 114  mg/dl Final    HIV-1/HIV-2 Ab 04/19/2021 Non-Reactive  Non-Reactive Final    Miscellaneous Lab Test Result 04/19/2021 SEE SCANNED REPORT   Final    RBC Morphology 04/19/2021 abnormal   Final    Hypochromia 04/19/2021 Present   Final    Microcytes 04/19/2021 Present   Final    Platelet Estimate 49/51/0642 Adequate  Adequate Final    Large Platelet 62/26/6316 Present   Final   Office Visit on 04/12/2021   Component Date Value Ref Range Status    Lyme total antibody 04/19/2021 12  0 00 - 119 Final    Negative (0-119) Absence of detectable Borrelia burgdoferi Antibodies  A negative result does not exclude the possibility of Borrelia infection  If early Lyme disease is suspected,a second sample should be collected & tested 4 weeks after initial testing       JUAN CARLOS 04/19/2021 Positive* Negative Final    Rheumatoid Factor 04/19/2021 Negative  Negative Final    SS-A (RO) Ab 04/19/2021 4 4* 0 0 - 0 9 AI Final    SS-B (LA) Ab 04/19/2021 <0 2  0 0 - 0 9 AI Final    JUAN CARLOS Titer 1 04/19/2021 Titer of 160   Final    JUAN CARLOS Pattern 1 04/19/2021 Speckled pattern   Final

## 2021-08-24 LAB — DSDNA AB SER-ACNC: 8 IU/ML (ref 0–9)

## 2021-09-10 DIAGNOSIS — E55.9 VITAMIN D DEFICIENCY: Primary | ICD-10-CM

## 2021-09-10 RX ORDER — LANOLIN ALCOHOL/MO/W.PET/CERES
1 CREAM (GRAM) TOPICAL 2 TIMES DAILY
Qty: 60 TABLET | Refills: 5 | Status: SHIPPED | OUTPATIENT
Start: 2021-09-10

## 2021-09-10 RX ORDER — ACETAMINOPHEN 160 MG
2000 TABLET,DISINTEGRATING ORAL DAILY
Qty: 30 CAPSULE | Refills: 5
Start: 2021-09-10 | End: 2021-10-10

## 2021-09-10 NOTE — PROGRESS NOTES
Received a message from Dr Weston Chawla about calcium oxalate crystals in urine and low vitamin d  Given supplements  Starting also Calcium citrate

## 2021-10-25 ENCOUNTER — HOSPITAL ENCOUNTER (OUTPATIENT)
Dept: NEUROLOGY | Facility: CLINIC | Age: 51
Discharge: HOME | End: 2021-10-25
Payer: COMMERCIAL

## 2021-10-25 DIAGNOSIS — G56.02 CARPAL TUNNEL SYNDROME OF LEFT WRIST: ICD-10-CM

## 2021-10-25 PROCEDURE — 95909 NRV CNDJ TST 5-6 STUDIES: CPT | Performed by: PSYCHIATRY & NEUROLOGY

## 2021-10-25 PROCEDURE — 95886 MUSC TEST DONE W/N TEST COMP: CPT | Performed by: PSYCHIATRY & NEUROLOGY

## 2022-01-17 ENCOUNTER — OFFICE VISIT (OUTPATIENT)
Dept: OBGYN CLINIC | Facility: CLINIC | Age: 52
End: 2022-01-17
Payer: COMMERCIAL

## 2022-01-17 VITALS — BODY MASS INDEX: 24.37 KG/M2 | WEIGHT: 129 LBS | DIASTOLIC BLOOD PRESSURE: 70 MMHG | SYSTOLIC BLOOD PRESSURE: 115 MMHG

## 2022-01-17 DIAGNOSIS — N90.89 VULVAR IRRITATION: Primary | ICD-10-CM

## 2022-01-17 PROCEDURE — 1036F TOBACCO NON-USER: CPT | Performed by: OBSTETRICS & GYNECOLOGY

## 2022-01-17 PROCEDURE — 99212 OFFICE O/P EST SF 10 MIN: CPT | Performed by: OBSTETRICS & GYNECOLOGY

## 2022-01-17 NOTE — PROGRESS NOTES
Assessment Bushra was seen today for possible infection  Diagnoses and all orders for this visit:    Vulvar irritation         Plan  Today we discussed trial of daily pro biotic to see if improvement of symptoms   Ia lso encouraged patient to have appointment at time symptoms present to be able to better evaluate and perform culture to treat appropriate infection if present   All questions answered    Subjective   Garry Mathias is a 54 y o  female here for a problem visit  Patient is complaining of yeast infection 1 weeks prior to her menses every month since September  Denies symptoms currently   States has been taking monistat every month  States when symptoms present gets thick white discharge , irritation and redness in vulvar area   Patient Active Problem List   Diagnosis    Herpes zoster without complication    Personal history of COVID-19    Cervical cancer screening    [facility] lupus erythematosus ([facility] [address] )    Encounter for general adult medical examination with abnormal findings    Positive JUAN CARLOS (antinuclear antibody)    WHITEHEAD (dyspnea on exertion)    Left arm pain    Iron deficiency anemia    Cervical radiculopathy       Gynecologic History  Patient's last menstrual period was 2022        Past Medical History:   Diagnosis Date    Celiac disease     Colitis     Gluten free diet      Past Surgical History:   Procedure Laterality Date     SECTION       Family History   Problem Relation Age of Onset    No Known Problems Mother     No Known Problems Father     No Known Problems Sister     No Known Problems Daughter     No Known Problems Maternal Grandmother     No Known Problems Maternal Grandfather     No Known Problems Paternal Grandmother     No Known Problems Paternal Grandfather     Ovarian cancer Maternal Aunt     Breast cancer Maternal Aunt      Social History     Socioeconomic History    Marital status: Single     Spouse name: Not on file    Number of children: Not on file    Years of education: Not on file    Highest education level: Not on file   Occupational History    Not on file   Tobacco Use    Smoking status: Former Smoker    Smokeless tobacco: Never Used    Tobacco comment: quit 20-30 yrs ago   Vaping Use    Vaping Use: Never used   Substance and Sexual Activity    Alcohol use: Yes     Comment: once in a while/occasional    Drug use: No    Sexual activity: Yes     Partners: Male     Birth control/protection: None   Other Topics Concern    Not on file   Social History Narrative    Not on file     Social Determinants of Health     Financial Resource Strain: Not on file   Food Insecurity: Not on file   Transportation Needs: Not on file   Physical Activity: Not on file   Stress: Not on file   Social Connections: Not on file   Intimate Partner Violence: Not on file   Housing Stability: Not on file     No Known Allergies    Current Outpatient Medications:     calcium citrate-vitamin D (CITRACAL+D) 315-200 MG-UNIT per tablet, Take 1 tablet by mouth 2 (two) times a day, Disp: 60 tablet, Rfl: 5    ferrous sulfate 324 (65 Fe) mg, Take 1 tablet (324 mg total) by mouth 2 (two) times a day before meals, Disp: 60 tablet, Rfl: 5    gabapentin (NEURONTIN) 100 mg capsule, Take 1 capsule (100 mg total) by mouth 3 (three) times a day, Disp: 90 capsule, Rfl: 3    Cholecalciferol (Vitamin D3) 50 MCG (2000 UT) capsule, Take 1 capsule (2,000 Units total) by mouth daily, Disp: 30 capsule, Rfl: 5    Review of Systems  Constitutional :no fever, feels well, no tiredness, no recent weight gain or loss  ENT: no ear ache, no loss of hearing, no nosebleeds or nasal discharge, no sore throat or hoarseness  Cardiovascular: no complaints of slow or fast heart beat, no chest pain, no palpitations, no leg claudication or lower extremity edema    Respiratory: no complaints of shortness of shortness of breath, no WHITEHEAD  Breasts:no complaints of breast pain, breast lump, or nipple discharge  Gastrointestinal: no complaints of abdominal pain, constipation, nausea, vomiting, or diarrhea or bloody stools  Genitourinary :  as noted in HPI  Musculoskeletal: no complaints of arthralgia, no myalgia, no joint swelling or stiffness, no limb pain or swelling  Integumentary: no complaints of skin rash or lesion, itching or dry skin  Neurological: no complaints of headache, no confusion, no numbness or tingling, no dizziness or fainting     Objective     /70   Wt 58 5 kg (129 lb)   LMP 01/04/2022   BMI 24 37 kg/m²     General:   appears stated age, cooperative, alert normal mood and affect   Lungs: unlabored breathing    Skin normal skin turgor and no rashes     Psychiatric orientation to person, place, and time: normal  mood and affect: normal